# Patient Record
Sex: FEMALE | Race: BLACK OR AFRICAN AMERICAN | NOT HISPANIC OR LATINO | URBAN - METROPOLITAN AREA
[De-identification: names, ages, dates, MRNs, and addresses within clinical notes are randomized per-mention and may not be internally consistent; named-entity substitution may affect disease eponyms.]

---

## 2023-11-24 ENCOUNTER — INPATIENT (INPATIENT)
Facility: HOSPITAL | Age: 28
LOS: 1 days | Discharge: HOME | End: 2023-11-26
Attending: STUDENT IN AN ORGANIZED HEALTH CARE EDUCATION/TRAINING PROGRAM | Admitting: STUDENT IN AN ORGANIZED HEALTH CARE EDUCATION/TRAINING PROGRAM
Payer: MEDICAID

## 2023-11-24 ENCOUNTER — TRANSCRIPTION ENCOUNTER (OUTPATIENT)
Age: 28
End: 2023-11-24

## 2023-11-24 VITALS
HEART RATE: 102 BPM | DIASTOLIC BLOOD PRESSURE: 88 MMHG | TEMPERATURE: 99 F | SYSTOLIC BLOOD PRESSURE: 128 MMHG | OXYGEN SATURATION: 100 % | RESPIRATION RATE: 20 BRPM

## 2023-11-24 PROCEDURE — 99285 EMERGENCY DEPT VISIT HI MDM: CPT

## 2023-11-24 RX ORDER — IOHEXOL 300 MG/ML
30 INJECTION, SOLUTION INTRAVENOUS ONCE
Refills: 0 | Status: COMPLETED | OUTPATIENT
Start: 2023-11-24 | End: 2023-11-25

## 2023-11-24 RX ORDER — ACETAMINOPHEN 500 MG
1000 TABLET ORAL ONCE
Refills: 0 | Status: COMPLETED | OUTPATIENT
Start: 2023-11-24 | End: 2023-11-25

## 2023-11-24 RX ORDER — ONDANSETRON 8 MG/1
4 TABLET, FILM COATED ORAL ONCE
Refills: 0 | Status: COMPLETED | OUTPATIENT
Start: 2023-11-24 | End: 2023-11-25

## 2023-11-24 RX ORDER — FAMOTIDINE 10 MG/ML
20 INJECTION INTRAVENOUS ONCE
Refills: 0 | Status: COMPLETED | OUTPATIENT
Start: 2023-11-24 | End: 2023-11-24

## 2023-11-24 RX ORDER — SODIUM CHLORIDE 9 MG/ML
1000 INJECTION INTRAMUSCULAR; INTRAVENOUS; SUBCUTANEOUS ONCE
Refills: 0 | Status: COMPLETED | OUTPATIENT
Start: 2023-11-24 | End: 2023-11-24

## 2023-11-24 NOTE — ED ADULT NURSE NOTE - NSFALLUNIVINTERV_ED_ALL_ED
Bed/Stretcher in lowest position, wheels locked, appropriate side rails in place/Call bell, personal items and telephone in reach/Instruct patient to call for assistance before getting out of bed/chair/stretcher/Non-slip footwear applied when patient is off stretcher/[place] to call system/Physically safe environment - no spills, clutter or unnecessary equipment/Purposeful proactive rounding/Room/bathroom lighting operational, light cord in reach

## 2023-11-24 NOTE — ED ADULT NURSE NOTE - IS THE PATIENT ABLE TO BE SCREENED?
Quality 402: Tobacco Use And Help With Quitting Among Adolescents: Patient screened for tobacco and never smoked
Quality 110: Preventive Care And Screening: Influenza Immunization: Influenza Immunization Administered during Influenza season
Quality 111:Pneumonia Vaccination Status For Older Adults: Pneumococcal Vaccination Previously Received
Quality 130: Documentation Of Current Medications In The Medical Record: Current Medications Documented
Detail Level: Simple
Yes

## 2023-11-24 NOTE — ED ADULT NURSE NOTE - OBJECTIVE STATEMENT
Pt received c/o abdominal pain, nausea and feeling like she needs to vomit but is only able to dry heave. Pt is alert and oriented x 4, ambulatory. Pt endorses smoking marijuana everyday, denies drinking and/or other drug use. Denies pertinent past medical history. MD saunders in progress, Awaiting orders. Safety maintained.

## 2023-11-24 NOTE — ED ADULT TRIAGE NOTE - NS ED TRIAGE AVPU SCALE
Alert-The patient is alert, awake and responds to voice. The patient is oriented to time, place, and person. The triage nurse is able to obtain subjective information. no known mental health issues.

## 2023-11-24 NOTE — ED ADULT TRIAGE NOTE - CHIEF COMPLAINT QUOTE
abd pain    c/o abd pain rad to the back and chest with nausea, vomiting, diarrhea.  actively vomiting in triage.  appears uncomfortable.  past medical history- appendectomy, gastric bypass,

## 2023-11-25 ENCOUNTER — RESULT REVIEW (OUTPATIENT)
Age: 28
End: 2023-11-25

## 2023-11-25 DIAGNOSIS — R11.2 NAUSEA WITH VOMITING, UNSPECIFIED: ICD-10-CM

## 2023-11-25 DIAGNOSIS — Z98.84 BARIATRIC SURGERY STATUS: Chronic | ICD-10-CM

## 2023-11-25 DIAGNOSIS — Z90.49 ACQUIRED ABSENCE OF OTHER SPECIFIED PARTS OF DIGESTIVE TRACT: Chronic | ICD-10-CM

## 2023-11-25 LAB
ALBUMIN SERPL ELPH-MCNC: 3.9 G/DL — SIGNIFICANT CHANGE UP (ref 3.3–5)
ALBUMIN SERPL ELPH-MCNC: 3.9 G/DL — SIGNIFICANT CHANGE UP (ref 3.3–5)
ALBUMIN SERPL ELPH-MCNC: 4.1 G/DL — SIGNIFICANT CHANGE UP (ref 3.3–5)
ALBUMIN SERPL ELPH-MCNC: 4.1 G/DL — SIGNIFICANT CHANGE UP (ref 3.3–5)
ALP SERPL-CCNC: 324 U/L — HIGH (ref 40–120)
ALP SERPL-CCNC: 324 U/L — HIGH (ref 40–120)
ALP SERPL-CCNC: 340 U/L — HIGH (ref 40–120)
ALP SERPL-CCNC: 340 U/L — HIGH (ref 40–120)
ALT FLD-CCNC: 378 U/L — HIGH (ref 4–33)
ALT FLD-CCNC: 378 U/L — HIGH (ref 4–33)
ALT FLD-CCNC: 643 U/L — HIGH (ref 4–33)
ALT FLD-CCNC: 643 U/L — HIGH (ref 4–33)
ANION GAP SERPL CALC-SCNC: 11 MMOL/L — SIGNIFICANT CHANGE UP (ref 7–14)
ANION GAP SERPL CALC-SCNC: 11 MMOL/L — SIGNIFICANT CHANGE UP (ref 7–14)
APAP SERPL-MCNC: <10 UG/ML — LOW (ref 15–25)
APAP SERPL-MCNC: <10 UG/ML — LOW (ref 15–25)
APPEARANCE UR: CLEAR — SIGNIFICANT CHANGE UP
APPEARANCE UR: CLEAR — SIGNIFICANT CHANGE UP
APTT BLD: 25.4 SEC — SIGNIFICANT CHANGE UP (ref 24.5–35.6)
APTT BLD: 25.4 SEC — SIGNIFICANT CHANGE UP (ref 24.5–35.6)
AST SERPL-CCNC: 1553 U/L — HIGH (ref 4–32)
AST SERPL-CCNC: 1553 U/L — HIGH (ref 4–32)
AST SERPL-CCNC: 998 U/L — HIGH (ref 4–32)
AST SERPL-CCNC: 998 U/L — HIGH (ref 4–32)
BASE EXCESS BLDV CALC-SCNC: -4.2 MMOL/L — LOW (ref -2–3)
BASE EXCESS BLDV CALC-SCNC: -4.2 MMOL/L — LOW (ref -2–3)
BASOPHILS # BLD AUTO: 0.02 K/UL — SIGNIFICANT CHANGE UP (ref 0–0.2)
BASOPHILS # BLD AUTO: 0.02 K/UL — SIGNIFICANT CHANGE UP (ref 0–0.2)
BASOPHILS NFR BLD AUTO: 0.2 % — SIGNIFICANT CHANGE UP (ref 0–2)
BASOPHILS NFR BLD AUTO: 0.2 % — SIGNIFICANT CHANGE UP (ref 0–2)
BILIRUB DIRECT SERPL-MCNC: 0.3 MG/DL — SIGNIFICANT CHANGE UP (ref 0–0.3)
BILIRUB DIRECT SERPL-MCNC: 0.3 MG/DL — SIGNIFICANT CHANGE UP (ref 0–0.3)
BILIRUB INDIRECT FLD-MCNC: 0.3 MG/DL — SIGNIFICANT CHANGE UP (ref 0–1)
BILIRUB INDIRECT FLD-MCNC: 0.3 MG/DL — SIGNIFICANT CHANGE UP (ref 0–1)
BILIRUB SERPL-MCNC: 0.6 MG/DL — SIGNIFICANT CHANGE UP (ref 0.2–1.2)
BILIRUB SERPL-MCNC: 0.6 MG/DL — SIGNIFICANT CHANGE UP (ref 0.2–1.2)
BILIRUB SERPL-MCNC: 0.7 MG/DL — SIGNIFICANT CHANGE UP (ref 0.2–1.2)
BILIRUB SERPL-MCNC: 0.7 MG/DL — SIGNIFICANT CHANGE UP (ref 0.2–1.2)
BILIRUB UR-MCNC: NEGATIVE — SIGNIFICANT CHANGE UP
BILIRUB UR-MCNC: NEGATIVE — SIGNIFICANT CHANGE UP
BLD GP AB SCN SERPL QL: NEGATIVE — SIGNIFICANT CHANGE UP
BLD GP AB SCN SERPL QL: NEGATIVE — SIGNIFICANT CHANGE UP
BLOOD GAS VENOUS COMPREHENSIVE RESULT: SIGNIFICANT CHANGE UP
BLOOD GAS VENOUS COMPREHENSIVE RESULT: SIGNIFICANT CHANGE UP
BUN SERPL-MCNC: 9 MG/DL — SIGNIFICANT CHANGE UP (ref 7–23)
BUN SERPL-MCNC: 9 MG/DL — SIGNIFICANT CHANGE UP (ref 7–23)
CALCIUM SERPL-MCNC: 8.9 MG/DL — SIGNIFICANT CHANGE UP (ref 8.4–10.5)
CALCIUM SERPL-MCNC: 8.9 MG/DL — SIGNIFICANT CHANGE UP (ref 8.4–10.5)
CHLORIDE BLDV-SCNC: 105 MMOL/L — SIGNIFICANT CHANGE UP (ref 96–108)
CHLORIDE BLDV-SCNC: 105 MMOL/L — SIGNIFICANT CHANGE UP (ref 96–108)
CHLORIDE SERPL-SCNC: 104 MMOL/L — SIGNIFICANT CHANGE UP (ref 98–107)
CHLORIDE SERPL-SCNC: 104 MMOL/L — SIGNIFICANT CHANGE UP (ref 98–107)
CO2 BLDV-SCNC: 22.2 MMOL/L — SIGNIFICANT CHANGE UP (ref 22–26)
CO2 BLDV-SCNC: 22.2 MMOL/L — SIGNIFICANT CHANGE UP (ref 22–26)
CO2 SERPL-SCNC: 21 MMOL/L — LOW (ref 22–31)
CO2 SERPL-SCNC: 21 MMOL/L — LOW (ref 22–31)
COLOR SPEC: YELLOW — SIGNIFICANT CHANGE UP
COLOR SPEC: YELLOW — SIGNIFICANT CHANGE UP
CREAT SERPL-MCNC: 0.7 MG/DL — SIGNIFICANT CHANGE UP (ref 0.5–1.3)
CREAT SERPL-MCNC: 0.7 MG/DL — SIGNIFICANT CHANGE UP (ref 0.5–1.3)
DIFF PNL FLD: NEGATIVE — SIGNIFICANT CHANGE UP
DIFF PNL FLD: NEGATIVE — SIGNIFICANT CHANGE UP
EGFR: 122 ML/MIN/1.73M2 — SIGNIFICANT CHANGE UP
EGFR: 122 ML/MIN/1.73M2 — SIGNIFICANT CHANGE UP
EOSINOPHIL # BLD AUTO: 0.06 K/UL — SIGNIFICANT CHANGE UP (ref 0–0.5)
EOSINOPHIL # BLD AUTO: 0.06 K/UL — SIGNIFICANT CHANGE UP (ref 0–0.5)
EOSINOPHIL NFR BLD AUTO: 0.7 % — SIGNIFICANT CHANGE UP (ref 0–6)
EOSINOPHIL NFR BLD AUTO: 0.7 % — SIGNIFICANT CHANGE UP (ref 0–6)
GAS PNL BLDV: 134 MMOL/L — LOW (ref 136–145)
GAS PNL BLDV: 134 MMOL/L — LOW (ref 136–145)
GLUCOSE BLDV-MCNC: 120 MG/DL — HIGH (ref 70–99)
GLUCOSE BLDV-MCNC: 120 MG/DL — HIGH (ref 70–99)
GLUCOSE SERPL-MCNC: 126 MG/DL — HIGH (ref 70–99)
GLUCOSE SERPL-MCNC: 126 MG/DL — HIGH (ref 70–99)
GLUCOSE UR QL: NEGATIVE MG/DL — SIGNIFICANT CHANGE UP
GLUCOSE UR QL: NEGATIVE MG/DL — SIGNIFICANT CHANGE UP
HAV IGM SER-ACNC: SIGNIFICANT CHANGE UP
HAV IGM SER-ACNC: SIGNIFICANT CHANGE UP
HBV CORE IGM SER-ACNC: SIGNIFICANT CHANGE UP
HBV CORE IGM SER-ACNC: SIGNIFICANT CHANGE UP
HBV SURFACE AG SER-ACNC: SIGNIFICANT CHANGE UP
HBV SURFACE AG SER-ACNC: SIGNIFICANT CHANGE UP
HCG SERPL-ACNC: <1 MIU/ML — SIGNIFICANT CHANGE UP
HCG SERPL-ACNC: <1 MIU/ML — SIGNIFICANT CHANGE UP
HCO3 BLDV-SCNC: 21 MMOL/L — LOW (ref 22–29)
HCO3 BLDV-SCNC: 21 MMOL/L — LOW (ref 22–29)
HCT VFR BLD CALC: 35.2 % — SIGNIFICANT CHANGE UP (ref 34.5–45)
HCT VFR BLD CALC: 35.2 % — SIGNIFICANT CHANGE UP (ref 34.5–45)
HCT VFR BLDA CALC: 36 % — SIGNIFICANT CHANGE UP (ref 34.5–46.5)
HCT VFR BLDA CALC: 36 % — SIGNIFICANT CHANGE UP (ref 34.5–46.5)
HCV AB S/CO SERPL IA: 0.07 S/CO — SIGNIFICANT CHANGE UP (ref 0–0.99)
HCV AB S/CO SERPL IA: 0.07 S/CO — SIGNIFICANT CHANGE UP (ref 0–0.99)
HCV AB SERPL-IMP: SIGNIFICANT CHANGE UP
HCV AB SERPL-IMP: SIGNIFICANT CHANGE UP
HGB BLD CALC-MCNC: 11.9 G/DL — SIGNIFICANT CHANGE UP (ref 11.7–16.1)
HGB BLD CALC-MCNC: 11.9 G/DL — SIGNIFICANT CHANGE UP (ref 11.7–16.1)
HGB BLD-MCNC: 11.3 G/DL — LOW (ref 11.5–15.5)
HGB BLD-MCNC: 11.3 G/DL — LOW (ref 11.5–15.5)
IANC: 6.88 K/UL — SIGNIFICANT CHANGE UP (ref 1.8–7.4)
IANC: 6.88 K/UL — SIGNIFICANT CHANGE UP (ref 1.8–7.4)
IMM GRANULOCYTES NFR BLD AUTO: 0.2 % — SIGNIFICANT CHANGE UP (ref 0–0.9)
IMM GRANULOCYTES NFR BLD AUTO: 0.2 % — SIGNIFICANT CHANGE UP (ref 0–0.9)
INR BLD: 1.09 RATIO — SIGNIFICANT CHANGE UP (ref 0.85–1.18)
INR BLD: 1.09 RATIO — SIGNIFICANT CHANGE UP (ref 0.85–1.18)
KETONES UR-MCNC: NEGATIVE MG/DL — SIGNIFICANT CHANGE UP
KETONES UR-MCNC: NEGATIVE MG/DL — SIGNIFICANT CHANGE UP
LACTATE BLDV-MCNC: 1.4 MMOL/L — SIGNIFICANT CHANGE UP (ref 0.5–2)
LACTATE BLDV-MCNC: 1.4 MMOL/L — SIGNIFICANT CHANGE UP (ref 0.5–2)
LEUKOCYTE ESTERASE UR-ACNC: NEGATIVE — SIGNIFICANT CHANGE UP
LEUKOCYTE ESTERASE UR-ACNC: NEGATIVE — SIGNIFICANT CHANGE UP
LIDOCAIN IGE QN: 23 U/L — SIGNIFICANT CHANGE UP (ref 7–60)
LIDOCAIN IGE QN: 23 U/L — SIGNIFICANT CHANGE UP (ref 7–60)
LYMPHOCYTES # BLD AUTO: 0.58 K/UL — LOW (ref 1–3.3)
LYMPHOCYTES # BLD AUTO: 0.58 K/UL — LOW (ref 1–3.3)
LYMPHOCYTES # BLD AUTO: 7.2 % — LOW (ref 13–44)
LYMPHOCYTES # BLD AUTO: 7.2 % — LOW (ref 13–44)
MCHC RBC-ENTMCNC: 29.6 PG — SIGNIFICANT CHANGE UP (ref 27–34)
MCHC RBC-ENTMCNC: 29.6 PG — SIGNIFICANT CHANGE UP (ref 27–34)
MCHC RBC-ENTMCNC: 32.1 GM/DL — SIGNIFICANT CHANGE UP (ref 32–36)
MCHC RBC-ENTMCNC: 32.1 GM/DL — SIGNIFICANT CHANGE UP (ref 32–36)
MCV RBC AUTO: 92.1 FL — SIGNIFICANT CHANGE UP (ref 80–100)
MCV RBC AUTO: 92.1 FL — SIGNIFICANT CHANGE UP (ref 80–100)
MONOCYTES # BLD AUTO: 0.54 K/UL — SIGNIFICANT CHANGE UP (ref 0–0.9)
MONOCYTES # BLD AUTO: 0.54 K/UL — SIGNIFICANT CHANGE UP (ref 0–0.9)
MONOCYTES NFR BLD AUTO: 6.7 % — SIGNIFICANT CHANGE UP (ref 2–14)
MONOCYTES NFR BLD AUTO: 6.7 % — SIGNIFICANT CHANGE UP (ref 2–14)
NEUTROPHILS # BLD AUTO: 6.88 K/UL — SIGNIFICANT CHANGE UP (ref 1.8–7.4)
NEUTROPHILS # BLD AUTO: 6.88 K/UL — SIGNIFICANT CHANGE UP (ref 1.8–7.4)
NEUTROPHILS NFR BLD AUTO: 85 % — HIGH (ref 43–77)
NEUTROPHILS NFR BLD AUTO: 85 % — HIGH (ref 43–77)
NITRITE UR-MCNC: NEGATIVE — SIGNIFICANT CHANGE UP
NITRITE UR-MCNC: NEGATIVE — SIGNIFICANT CHANGE UP
NRBC # BLD: 0 /100 WBCS — SIGNIFICANT CHANGE UP (ref 0–0)
NRBC # BLD: 0 /100 WBCS — SIGNIFICANT CHANGE UP (ref 0–0)
NRBC # FLD: 0 K/UL — SIGNIFICANT CHANGE UP (ref 0–0)
NRBC # FLD: 0 K/UL — SIGNIFICANT CHANGE UP (ref 0–0)
PCO2 BLDV: 38 MMHG — LOW (ref 39–52)
PCO2 BLDV: 38 MMHG — LOW (ref 39–52)
PH BLDV: 7.35 — SIGNIFICANT CHANGE UP (ref 7.32–7.43)
PH BLDV: 7.35 — SIGNIFICANT CHANGE UP (ref 7.32–7.43)
PH UR: 5.5 — SIGNIFICANT CHANGE UP (ref 5–8)
PH UR: 5.5 — SIGNIFICANT CHANGE UP (ref 5–8)
PLATELET # BLD AUTO: 305 K/UL — SIGNIFICANT CHANGE UP (ref 150–400)
PLATELET # BLD AUTO: 305 K/UL — SIGNIFICANT CHANGE UP (ref 150–400)
PO2 BLDV: 48 MMHG — HIGH (ref 25–45)
PO2 BLDV: 48 MMHG — HIGH (ref 25–45)
POTASSIUM BLDV-SCNC: 4.1 MMOL/L — SIGNIFICANT CHANGE UP (ref 3.5–5.1)
POTASSIUM BLDV-SCNC: 4.1 MMOL/L — SIGNIFICANT CHANGE UP (ref 3.5–5.1)
POTASSIUM SERPL-MCNC: 3.8 MMOL/L — SIGNIFICANT CHANGE UP (ref 3.5–5.3)
POTASSIUM SERPL-MCNC: 3.8 MMOL/L — SIGNIFICANT CHANGE UP (ref 3.5–5.3)
POTASSIUM SERPL-SCNC: 3.8 MMOL/L — SIGNIFICANT CHANGE UP (ref 3.5–5.3)
POTASSIUM SERPL-SCNC: 3.8 MMOL/L — SIGNIFICANT CHANGE UP (ref 3.5–5.3)
PROT SERPL-MCNC: 6.9 G/DL — SIGNIFICANT CHANGE UP (ref 6–8.3)
PROT SERPL-MCNC: 6.9 G/DL — SIGNIFICANT CHANGE UP (ref 6–8.3)
PROT SERPL-MCNC: 7.3 G/DL — SIGNIFICANT CHANGE UP (ref 6–8.3)
PROT SERPL-MCNC: 7.3 G/DL — SIGNIFICANT CHANGE UP (ref 6–8.3)
PROT UR-MCNC: NEGATIVE MG/DL — SIGNIFICANT CHANGE UP
PROT UR-MCNC: NEGATIVE MG/DL — SIGNIFICANT CHANGE UP
PROTHROM AB SERPL-ACNC: 12.3 SEC — SIGNIFICANT CHANGE UP (ref 9.5–13)
PROTHROM AB SERPL-ACNC: 12.3 SEC — SIGNIFICANT CHANGE UP (ref 9.5–13)
RBC # BLD: 3.82 M/UL — SIGNIFICANT CHANGE UP (ref 3.8–5.2)
RBC # BLD: 3.82 M/UL — SIGNIFICANT CHANGE UP (ref 3.8–5.2)
RBC # FLD: 13.5 % — SIGNIFICANT CHANGE UP (ref 10.3–14.5)
RBC # FLD: 13.5 % — SIGNIFICANT CHANGE UP (ref 10.3–14.5)
RH IG SCN BLD-IMP: POSITIVE — SIGNIFICANT CHANGE UP
RH IG SCN BLD-IMP: POSITIVE — SIGNIFICANT CHANGE UP
SAO2 % BLDV: 76.7 % — SIGNIFICANT CHANGE UP (ref 67–88)
SAO2 % BLDV: 76.7 % — SIGNIFICANT CHANGE UP (ref 67–88)
SODIUM SERPL-SCNC: 136 MMOL/L — SIGNIFICANT CHANGE UP (ref 135–145)
SODIUM SERPL-SCNC: 136 MMOL/L — SIGNIFICANT CHANGE UP (ref 135–145)
SP GR SPEC: 1.02 — SIGNIFICANT CHANGE UP (ref 1–1.03)
SP GR SPEC: 1.02 — SIGNIFICANT CHANGE UP (ref 1–1.03)
TROPONIN T, HIGH SENSITIVITY RESULT: <6 NG/L — SIGNIFICANT CHANGE UP
TROPONIN T, HIGH SENSITIVITY RESULT: <6 NG/L — SIGNIFICANT CHANGE UP
UROBILINOGEN FLD QL: 0.2 MG/DL — SIGNIFICANT CHANGE UP (ref 0.2–1)
UROBILINOGEN FLD QL: 0.2 MG/DL — SIGNIFICANT CHANGE UP (ref 0.2–1)
WBC # BLD: 8.1 K/UL — SIGNIFICANT CHANGE UP (ref 3.8–10.5)
WBC # BLD: 8.1 K/UL — SIGNIFICANT CHANGE UP (ref 3.8–10.5)
WBC # FLD AUTO: 8.1 K/UL — SIGNIFICANT CHANGE UP (ref 3.8–10.5)
WBC # FLD AUTO: 8.1 K/UL — SIGNIFICANT CHANGE UP (ref 3.8–10.5)

## 2023-11-25 PROCEDURE — 76705 ECHO EXAM OF ABDOMEN: CPT | Mod: 26

## 2023-11-25 PROCEDURE — 74018 RADEX ABDOMEN 1 VIEW: CPT | Mod: 26

## 2023-11-25 PROCEDURE — 99222 1ST HOSP IP/OBS MODERATE 55: CPT | Mod: 57,GC

## 2023-11-25 PROCEDURE — 71046 X-RAY EXAM CHEST 2 VIEWS: CPT | Mod: 26

## 2023-11-25 PROCEDURE — 47563 LAPARO CHOLECYSTECTOMY/GRAPH: CPT | Mod: GC

## 2023-11-25 PROCEDURE — 88304 TISSUE EXAM BY PATHOLOGIST: CPT | Mod: 26

## 2023-11-25 PROCEDURE — 74177 CT ABD & PELVIS W/CONTRAST: CPT | Mod: 26,MA

## 2023-11-25 DEVICE — LIGATING CLIPS WECK HEMOLOK POLYMER MEDIUM-LARGE (GREEN) 6: Type: IMPLANTABLE DEVICE | Status: FUNCTIONAL

## 2023-11-25 DEVICE — CATH REDDICK W/TROCAR 4FRX50CM: Type: IMPLANTABLE DEVICE | Status: FUNCTIONAL

## 2023-11-25 RX ORDER — HYDROMORPHONE HYDROCHLORIDE 2 MG/ML
0.2 INJECTION INTRAMUSCULAR; INTRAVENOUS; SUBCUTANEOUS EVERY 4 HOURS
Refills: 0 | Status: DISCONTINUED | OUTPATIENT
Start: 2023-11-25 | End: 2023-11-25

## 2023-11-25 RX ORDER — FENTANYL CITRATE 50 UG/ML
25 INJECTION INTRAVENOUS
Refills: 0 | Status: DISCONTINUED | OUTPATIENT
Start: 2023-11-25 | End: 2023-11-25

## 2023-11-25 RX ORDER — SODIUM CHLORIDE 9 MG/ML
1000 INJECTION, SOLUTION INTRAVENOUS
Refills: 0 | Status: DISCONTINUED | OUTPATIENT
Start: 2023-11-25 | End: 2023-11-26

## 2023-11-25 RX ORDER — HYDROMORPHONE HYDROCHLORIDE 2 MG/ML
0.5 INJECTION INTRAMUSCULAR; INTRAVENOUS; SUBCUTANEOUS EVERY 4 HOURS
Refills: 0 | Status: DISCONTINUED | OUTPATIENT
Start: 2023-11-25 | End: 2023-11-25

## 2023-11-25 RX ORDER — OXYCODONE HYDROCHLORIDE 5 MG/1
5 TABLET ORAL ONCE
Refills: 0 | Status: DISCONTINUED | OUTPATIENT
Start: 2023-11-25 | End: 2023-11-25

## 2023-11-25 RX ORDER — OXYCODONE HYDROCHLORIDE 5 MG/1
5 TABLET ORAL EVERY 4 HOURS
Refills: 0 | Status: DISCONTINUED | OUTPATIENT
Start: 2023-11-25 | End: 2023-11-26

## 2023-11-25 RX ORDER — ACETAMINOPHEN 500 MG
975 TABLET ORAL EVERY 6 HOURS
Refills: 0 | Status: DISCONTINUED | OUTPATIENT
Start: 2023-11-25 | End: 2023-11-26

## 2023-11-25 RX ORDER — OXYCODONE HYDROCHLORIDE 5 MG/1
2.5 TABLET ORAL EVERY 4 HOURS
Refills: 0 | Status: DISCONTINUED | OUTPATIENT
Start: 2023-11-25 | End: 2023-11-26

## 2023-11-25 RX ORDER — ONDANSETRON 8 MG/1
4 TABLET, FILM COATED ORAL ONCE
Refills: 0 | Status: DISCONTINUED | OUTPATIENT
Start: 2023-11-25 | End: 2023-11-25

## 2023-11-25 RX ORDER — HYDROMORPHONE HYDROCHLORIDE 2 MG/ML
0.5 INJECTION INTRAMUSCULAR; INTRAVENOUS; SUBCUTANEOUS
Refills: 0 | Status: DISCONTINUED | OUTPATIENT
Start: 2023-11-25 | End: 2023-11-25

## 2023-11-25 RX ORDER — INFLUENZA VIRUS VACCINE 15; 15; 15; 15 UG/.5ML; UG/.5ML; UG/.5ML; UG/.5ML
0.5 SUSPENSION INTRAMUSCULAR ONCE
Refills: 0 | Status: DISCONTINUED | OUTPATIENT
Start: 2023-11-25 | End: 2023-11-26

## 2023-11-25 RX ORDER — ENOXAPARIN SODIUM 100 MG/ML
40 INJECTION SUBCUTANEOUS EVERY 24 HOURS
Refills: 0 | Status: DISCONTINUED | OUTPATIENT
Start: 2023-11-25 | End: 2023-11-26

## 2023-11-25 RX ORDER — ACETAMINOPHEN 500 MG
1000 TABLET ORAL EVERY 6 HOURS
Refills: 0 | Status: DISCONTINUED | OUTPATIENT
Start: 2023-11-25 | End: 2023-11-25

## 2023-11-25 RX ORDER — ACETAMINOPHEN 500 MG
500 TABLET ORAL EVERY 6 HOURS
Refills: 0 | Status: DISCONTINUED | OUTPATIENT
Start: 2023-11-25 | End: 2023-11-25

## 2023-11-25 RX ORDER — FENTANYL CITRATE 50 UG/ML
50 INJECTION INTRAVENOUS
Refills: 0 | Status: DISCONTINUED | OUTPATIENT
Start: 2023-11-25 | End: 2023-11-25

## 2023-11-25 RX ADMIN — OXYCODONE HYDROCHLORIDE 5 MILLIGRAM(S): 5 TABLET ORAL at 17:30

## 2023-11-25 RX ADMIN — ENOXAPARIN SODIUM 40 MILLIGRAM(S): 100 INJECTION SUBCUTANEOUS at 12:19

## 2023-11-25 RX ADMIN — OXYCODONE HYDROCHLORIDE 5 MILLIGRAM(S): 5 TABLET ORAL at 17:00

## 2023-11-25 RX ADMIN — HYDROMORPHONE HYDROCHLORIDE 0.5 MILLIGRAM(S): 2 INJECTION INTRAMUSCULAR; INTRAVENOUS; SUBCUTANEOUS at 17:00

## 2023-11-25 RX ADMIN — Medication 200 MILLIGRAM(S): at 12:19

## 2023-11-25 RX ADMIN — HYDROMORPHONE HYDROCHLORIDE 0.5 MILLIGRAM(S): 2 INJECTION INTRAMUSCULAR; INTRAVENOUS; SUBCUTANEOUS at 16:30

## 2023-11-25 RX ADMIN — Medication 975 MILLIGRAM(S): at 23:11

## 2023-11-25 RX ADMIN — Medication 1000 MILLIGRAM(S): at 01:37

## 2023-11-25 RX ADMIN — OXYCODONE HYDROCHLORIDE 5 MILLIGRAM(S): 5 TABLET ORAL at 22:10

## 2023-11-25 RX ADMIN — Medication 30 MILLILITER(S): at 00:37

## 2023-11-25 RX ADMIN — Medication 400 MILLIGRAM(S): at 00:37

## 2023-11-25 RX ADMIN — SODIUM CHLORIDE 50 MILLILITER(S): 9 INJECTION, SOLUTION INTRAVENOUS at 19:54

## 2023-11-25 RX ADMIN — SODIUM CHLORIDE 1000 MILLILITER(S): 9 INJECTION INTRAMUSCULAR; INTRAVENOUS; SUBCUTANEOUS at 00:35

## 2023-11-25 RX ADMIN — FAMOTIDINE 20 MILLIGRAM(S): 10 INJECTION INTRAVENOUS at 00:34

## 2023-11-25 RX ADMIN — OXYCODONE HYDROCHLORIDE 5 MILLIGRAM(S): 5 TABLET ORAL at 22:40

## 2023-11-25 RX ADMIN — IOHEXOL 30 MILLILITER(S): 300 INJECTION, SOLUTION INTRAVENOUS at 00:38

## 2023-11-25 RX ADMIN — Medication 975 MILLIGRAM(S): at 17:39

## 2023-11-25 RX ADMIN — ONDANSETRON 4 MILLIGRAM(S): 8 TABLET, FILM COATED ORAL at 00:37

## 2023-11-25 RX ADMIN — Medication 975 MILLIGRAM(S): at 17:40

## 2023-11-25 NOTE — CHART NOTE - NSCHARTNOTEFT_GEN_A_CORE
General Surgery Post op Check    Pt seen and examined without complaints. Pain is controlled. Denies SOB/CP/N/V.     Vital Signs Last 24 Hrs  T(C): 36.4 (25 Nov 2023 17:55), Max: 37.1 (25 Nov 2023 16:15)  T(F): 97.6 (25 Nov 2023 17:55), Max: 98.8 (25 Nov 2023 16:15)  HR: 52 (25 Nov 2023 17:55) (52 - 95)  BP: 115/68 (25 Nov 2023 17:55) (98/61 - 127/81)  BP(mean): 82 (25 Nov 2023 17:45) (77 - 97)  RR: 18 (25 Nov 2023 17:55) (15 - 20)  SpO2: 100% (25 Nov 2023 17:55) (96% - 100%)    Parameters below as of 25 Nov 2023 17:55  Patient On (Oxygen Delivery Method): room air        I&O's Summary    25 Nov 2023 07:01  -  25 Nov 2023 21:20  --------------------------------------------------------  IN: 465 mL / OUT: 0 mL / NET: 465 mL        Physical Exam  Gen: NAD, A&Ox3  Pulm: No respiratory distress, no subcostal retractions  CV: RRR, no JVD  Abd: Soft, Non-tender, non-distended, incisions c/d/i  UOP: 0cc      A/P: 28y F s/p lap cholecystectomy. Patient endorses appropriate pain control and denies nausea, vomits, fever, chills, or SOB. Abdominal exam with appropriate incisional tenderness without significant distention, or bleeding/oozing from incision sites. Patient hemodynamically stable recovering well post-procedure.     -Encouraged OOB  -Diet: Regular   -Strict I&O's  -Analgesia and antiemetics as needed  -Dispo:  Floor

## 2023-11-25 NOTE — H&P ADULT - ASSESSMENT
28F hx robotic RYGB (February 2022 [facility]), laparoscopic appendectomy (August 2023, [facility]) presenting with midepigastric and RUQ abdominal pain, cholelithiasis, transaminitis concerning for choledocholithiasis vs passed stone. CT and US showing cholelithiasis and periportal/periductal edema. No signs of cholecystitis, no leukocytosis or fevers to suggest cholangitis.    Admit to Dr. Hazel Oneal  NPO  IVF  OR for lap zayra, IOC, poss CBDE  Pain control PRN  SCDs, LVX    Kaitlin Salazar, PGY3  B Team Surgery [id number]

## 2023-11-25 NOTE — ED PROVIDER NOTE - PROGRESS NOTE DETAILS
Isabelle Torres MD, PGY2:  Patient with elevated LFTs concerning for biliary disease versus hepatic disease, pending CT read at this time, will consider obtaining right upper quadrant ultrasound pending CT findings, will get hepatitis panel and acetaminophen level. Isabelle Torres MD, PGY2: CT findings shows concern for bilary duct dilation, will obtain RUQ US now Isabelle Torres MD, PGY2: pending USr, surg consulted, will come see pt Isabelle Torres MD, PGY2: surg accepted pt for admission

## 2023-11-25 NOTE — H&P ADULT - HISTORY OF PRESENT ILLNESS
28F hx robotic RYGB (February 2022 [facility]), laparoscopic appendectomy (August 2023, [facility]) presenting with one day midepigastric abdominal pain radiating to RUQ and back associated with nausea and one episode dry heaving. Denies fevers, chills, emesis. Is currently passing flatus, last BM last night which was normal per patient. She has lost 138 lbs since her bariatric surgery.

## 2023-11-25 NOTE — H&P ADULT - NSHPPHYSICALEXAM_GEN_ALL_CORE
Vital Signs Last 24 Hrs  T(C): 36.4 (25 Nov 2023 05:55), Max: 37 (24 Nov 2023 21:15)  T(F): 97.6 (25 Nov 2023 05:55), Max: 98.6 (24 Nov 2023 21:15)  HR: 62 (25 Nov 2023 05:55) (62 - 102)  BP: 108/64 (25 Nov 2023 05:55) (108/64 - 128/88)  BP(mean): --  RR: 18 (25 Nov 2023 05:55) (18 - 20)  SpO2: 100% (25 Nov 2023 05:55) (100% - 100%)    Parameters below as of 25 Nov 2023 05:55  Patient On (Oxygen Delivery Method): room air    General: well developed, well nourished, NAD  Neuro: alert and oriented, no focal deficits, moves all extremities spontaneously  HEENT: NCAT, EOMI, anicteric, mucosa moist  Respiratory: airway patent, respirations unlabored  CVS: regular rate and rhythm  Abdomen: soft, nontender, nondistended, incisions well healed  Extremities: no edema, sensation and movement grossly intact  Skin: warm, dry, appropriate color

## 2023-11-25 NOTE — PATIENT PROFILE ADULT - FALL HARM RISK - HARM RISK INTERVENTIONS

## 2023-11-25 NOTE — ED PROVIDER NOTE - ATTENDING CONTRIBUTION TO CARE
28-year-old female, no past medical history, past surgical history of appendectomy and gastric bypass (2022, [place]), presents to ED complaining of sudden onset diffuse abdominal pain associated with nausea/vomiting/diarrhea x few hours.  Patient states vomit is nonbilious/nonbloody.  Denies any blood in stool.  Patient states no other family members with similar symptoms.  Patient denies fever or recent URI symptoms.  Denies urinary symptoms, weakness/numbness, lightheadedness/dizziness or any other symptoms at this time.  Denies recent travel/surgeries.    Vital signs stable.  Physical exam significant for uncomfortable appearing patient in no acute distress.  Head is normocephalic/atraumatic.  Extraocular movements intact.  Pupils equal round reactive to light.  No scleral icterus.  Neck is supple.  Heart is regular rate and rhythm without murmur.  Lungs clear to auscultation bilaterally.  Abdomen is soft, however with diffuse tenderness to palpation (worse in the epigastrium).  No rebound/guarding.  Patient with right CVA tenderness to palpation.  No lower extremity edema.  Pulses are 2+ throughout.  Skin is warm and well-perfused without rashes.  Neuroexam is nonfocal.  Otherwise unremarkable exam.    Differential diagnosis includes but not limited to viral gastroenteritis versus atypical ACS versus pancreatitis versus kidney stone versus bariatric surgery complication.  Plan to check basic labs, lipase, UA, troponin, chest x-ray, CT abdomen pelvis.  Will provide IV fluid/Tylenol/Pepcid/Zofran for relief.  Dispo pending results and reassessment.

## 2023-11-25 NOTE — H&P ADULT - NSHPLABSRESULTS_GEN_ALL_CORE
11.3   8.10  )-----------( 305      ( 2023 00:30 )             35.2           136  |  104  |  9   ----------------------------<  126<H>  3.8   |  21<L>  |  0.70    Ca    8.9      2023 00:30    TPro  6.9  /  Alb  3.9  /  TBili  0.6  /  DBili  0.3  /  AST  1553<H>  /  ALT  643<H>  /  AlkPhos  340<H>                Urinalysis Basic - ( 2023 01:19 )    Color: Yellow / Appearance: Clear / S.022 / pH: x  Gluc: x / Ketone: Negative mg/dL  / Bili: Negative / Urobili: 0.2 mg/dL   Blood: x / Protein: Negative mg/dL / Nitrite: Negative   Leuk Esterase: Negative / RBC: x / WBC x   Sq Epi: x / Non Sq Epi: x / Bacteria: x        PT/INR - ( 2023 00:30 )   PT: 12.3 sec;   INR: 1.09 ratio         PTT - ( 2023 00:30 )  PTT:25.4 sec    < from: US Abdomen Limited (23 @ 04:15) >      Cholelithiasis without evidence of cholecystitis.    The cystic duct is obscured. Mild pericholecystic edema. Given the CT   appearance of the cystic duct in conjunction with the patient's clinical   symptoms, an MRI/MRCP is advised.    < end of copied text >    < from: CT Abdomen and Pelvis w/ Oral Cont and w/ IV Cont (23 @ 01:51) >    IMPRESSION:    1. Appearance of the gallbladder and cystic duct could be related to   cholangitis, or recent passage of stone. Right upper quadrant ultrasound   with attention on the extrahepatic biliary tree and gallbladder is   advised.    < end of copied text >

## 2023-11-25 NOTE — H&P ADULT - ATTENDING COMMENTS
I have reviewed the history, pertinent labs and imaging, and discussed the care with the consult resident.  More than 50% of this 55 minute encounter including face to face with the patient was spent counseling and/or coordination of care on symptomatic cholelithiasis.  Time included review of vitals, labs, imaging, discussion with consultants and coordination with the operating room/emergency department.    29yo F s/p lap bypass and lap appy presenting with RUQ pain and nausea. Found to have elevated transaminases, cholelithiasis and edema around the cystic and common duct, suspicious for passed gallstone. Recommended cholecystectomy with IOC and possible cbd exploration. Pt agreeable to proceed.     - NPO, IVF   - add on for lap zayra pending OR availability   - repeat LFTs daily      The active issues are:  1. symptomatic cholelithiasis     The Acute Care Surgery (B Team) Attending Group Practice:  Dr. Hazel Oneal    urgent issues - spectra [phone]  nonurgent issues - [phone]  patient appointments or afterhours - [phone]

## 2023-11-25 NOTE — ED ADULT NURSE REASSESSMENT NOTE - NS ED NURSE REASSESS COMMENT FT1
Report given by Carlee HE. IV inserted to Left AC 20g, labs collected and meds given as MD ordered. CT called around 12:45am to inform when oral contrast was given. Pt reports pain is better after pain medications. Pt safety maintained.

## 2023-11-26 ENCOUNTER — TRANSCRIPTION ENCOUNTER (OUTPATIENT)
Age: 28
End: 2023-11-26

## 2023-11-26 VITALS
TEMPERATURE: 98 F | RESPIRATION RATE: 18 BRPM | DIASTOLIC BLOOD PRESSURE: 56 MMHG | OXYGEN SATURATION: 100 % | HEART RATE: 63 BPM | SYSTOLIC BLOOD PRESSURE: 103 MMHG

## 2023-11-26 LAB
ALBUMIN SERPL ELPH-MCNC: 3.5 G/DL — SIGNIFICANT CHANGE UP (ref 3.3–5)
ALBUMIN SERPL ELPH-MCNC: 3.5 G/DL — SIGNIFICANT CHANGE UP (ref 3.3–5)
ALP SERPL-CCNC: 275 U/L — HIGH (ref 40–120)
ALP SERPL-CCNC: 275 U/L — HIGH (ref 40–120)
ALT FLD-CCNC: 405 U/L — HIGH (ref 4–33)
ALT FLD-CCNC: 405 U/L — HIGH (ref 4–33)
ANION GAP SERPL CALC-SCNC: 9 MMOL/L — SIGNIFICANT CHANGE UP (ref 7–14)
ANION GAP SERPL CALC-SCNC: 9 MMOL/L — SIGNIFICANT CHANGE UP (ref 7–14)
AST SERPL-CCNC: 250 U/L — HIGH (ref 4–32)
AST SERPL-CCNC: 250 U/L — HIGH (ref 4–32)
BILIRUB SERPL-MCNC: 0.3 MG/DL — SIGNIFICANT CHANGE UP (ref 0.2–1.2)
BILIRUB SERPL-MCNC: 0.3 MG/DL — SIGNIFICANT CHANGE UP (ref 0.2–1.2)
BUN SERPL-MCNC: 8 MG/DL — SIGNIFICANT CHANGE UP (ref 7–23)
BUN SERPL-MCNC: 8 MG/DL — SIGNIFICANT CHANGE UP (ref 7–23)
CALCIUM SERPL-MCNC: 9 MG/DL — SIGNIFICANT CHANGE UP (ref 8.4–10.5)
CALCIUM SERPL-MCNC: 9 MG/DL — SIGNIFICANT CHANGE UP (ref 8.4–10.5)
CHLORIDE SERPL-SCNC: 100 MMOL/L — SIGNIFICANT CHANGE UP (ref 98–107)
CHLORIDE SERPL-SCNC: 100 MMOL/L — SIGNIFICANT CHANGE UP (ref 98–107)
CO2 SERPL-SCNC: 24 MMOL/L — SIGNIFICANT CHANGE UP (ref 22–31)
CO2 SERPL-SCNC: 24 MMOL/L — SIGNIFICANT CHANGE UP (ref 22–31)
CREAT SERPL-MCNC: 0.71 MG/DL — SIGNIFICANT CHANGE UP (ref 0.5–1.3)
CREAT SERPL-MCNC: 0.71 MG/DL — SIGNIFICANT CHANGE UP (ref 0.5–1.3)
CULTURE RESULTS: SIGNIFICANT CHANGE UP
CULTURE RESULTS: SIGNIFICANT CHANGE UP
EGFR: 120 ML/MIN/1.73M2 — SIGNIFICANT CHANGE UP
EGFR: 120 ML/MIN/1.73M2 — SIGNIFICANT CHANGE UP
GLUCOSE SERPL-MCNC: 122 MG/DL — HIGH (ref 70–99)
GLUCOSE SERPL-MCNC: 122 MG/DL — HIGH (ref 70–99)
HCT VFR BLD CALC: 32.6 % — LOW (ref 34.5–45)
HCT VFR BLD CALC: 32.6 % — LOW (ref 34.5–45)
HGB BLD-MCNC: 10.6 G/DL — LOW (ref 11.5–15.5)
HGB BLD-MCNC: 10.6 G/DL — LOW (ref 11.5–15.5)
MAGNESIUM SERPL-MCNC: 1.9 MG/DL — SIGNIFICANT CHANGE UP (ref 1.6–2.6)
MAGNESIUM SERPL-MCNC: 1.9 MG/DL — SIGNIFICANT CHANGE UP (ref 1.6–2.6)
MCHC RBC-ENTMCNC: 29.7 PG — SIGNIFICANT CHANGE UP (ref 27–34)
MCHC RBC-ENTMCNC: 29.7 PG — SIGNIFICANT CHANGE UP (ref 27–34)
MCHC RBC-ENTMCNC: 32.5 GM/DL — SIGNIFICANT CHANGE UP (ref 32–36)
MCHC RBC-ENTMCNC: 32.5 GM/DL — SIGNIFICANT CHANGE UP (ref 32–36)
MCV RBC AUTO: 91.3 FL — SIGNIFICANT CHANGE UP (ref 80–100)
MCV RBC AUTO: 91.3 FL — SIGNIFICANT CHANGE UP (ref 80–100)
NRBC # BLD: 0 /100 WBCS — SIGNIFICANT CHANGE UP (ref 0–0)
NRBC # BLD: 0 /100 WBCS — SIGNIFICANT CHANGE UP (ref 0–0)
NRBC # FLD: 0 K/UL — SIGNIFICANT CHANGE UP (ref 0–0)
NRBC # FLD: 0 K/UL — SIGNIFICANT CHANGE UP (ref 0–0)
PHOSPHATE SERPL-MCNC: 3.2 MG/DL — SIGNIFICANT CHANGE UP (ref 2.5–4.5)
PHOSPHATE SERPL-MCNC: 3.2 MG/DL — SIGNIFICANT CHANGE UP (ref 2.5–4.5)
PLATELET # BLD AUTO: 316 K/UL — SIGNIFICANT CHANGE UP (ref 150–400)
PLATELET # BLD AUTO: 316 K/UL — SIGNIFICANT CHANGE UP (ref 150–400)
POTASSIUM SERPL-MCNC: 4 MMOL/L — SIGNIFICANT CHANGE UP (ref 3.5–5.3)
POTASSIUM SERPL-MCNC: 4 MMOL/L — SIGNIFICANT CHANGE UP (ref 3.5–5.3)
POTASSIUM SERPL-SCNC: 4 MMOL/L — SIGNIFICANT CHANGE UP (ref 3.5–5.3)
POTASSIUM SERPL-SCNC: 4 MMOL/L — SIGNIFICANT CHANGE UP (ref 3.5–5.3)
PROT SERPL-MCNC: 7 G/DL — SIGNIFICANT CHANGE UP (ref 6–8.3)
PROT SERPL-MCNC: 7 G/DL — SIGNIFICANT CHANGE UP (ref 6–8.3)
RBC # BLD: 3.57 M/UL — LOW (ref 3.8–5.2)
RBC # BLD: 3.57 M/UL — LOW (ref 3.8–5.2)
RBC # FLD: 13.6 % — SIGNIFICANT CHANGE UP (ref 10.3–14.5)
RBC # FLD: 13.6 % — SIGNIFICANT CHANGE UP (ref 10.3–14.5)
SODIUM SERPL-SCNC: 133 MMOL/L — LOW (ref 135–145)
SODIUM SERPL-SCNC: 133 MMOL/L — LOW (ref 135–145)
SPECIMEN SOURCE: SIGNIFICANT CHANGE UP
SPECIMEN SOURCE: SIGNIFICANT CHANGE UP
WBC # BLD: 6.19 K/UL — SIGNIFICANT CHANGE UP (ref 3.8–10.5)
WBC # BLD: 6.19 K/UL — SIGNIFICANT CHANGE UP (ref 3.8–10.5)
WBC # FLD AUTO: 6.19 K/UL — SIGNIFICANT CHANGE UP (ref 3.8–10.5)
WBC # FLD AUTO: 6.19 K/UL — SIGNIFICANT CHANGE UP (ref 3.8–10.5)

## 2023-11-26 RX ORDER — OXYCODONE HYDROCHLORIDE 5 MG/1
1 TABLET ORAL
Qty: 10 | Refills: 0
Start: 2023-11-26 | End: 2023-11-27

## 2023-11-26 RX ORDER — IBUPROFEN 200 MG
1 TABLET ORAL
Qty: 15 | Refills: 0
Start: 2023-11-26 | End: 2023-11-30

## 2023-11-26 RX ORDER — ACETAMINOPHEN 500 MG
1 TABLET ORAL
Qty: 20 | Refills: 0
Start: 2023-11-26 | End: 2023-11-30

## 2023-11-26 RX ADMIN — OXYCODONE HYDROCHLORIDE 5 MILLIGRAM(S): 5 TABLET ORAL at 10:20

## 2023-11-26 RX ADMIN — OXYCODONE HYDROCHLORIDE 5 MILLIGRAM(S): 5 TABLET ORAL at 04:00

## 2023-11-26 RX ADMIN — Medication 975 MILLIGRAM(S): at 05:29

## 2023-11-26 RX ADMIN — Medication 975 MILLIGRAM(S): at 17:53

## 2023-11-26 RX ADMIN — ENOXAPARIN SODIUM 40 MILLIGRAM(S): 100 INJECTION SUBCUTANEOUS at 12:11

## 2023-11-26 RX ADMIN — OXYCODONE HYDROCHLORIDE 5 MILLIGRAM(S): 5 TABLET ORAL at 03:30

## 2023-11-26 RX ADMIN — OXYCODONE HYDROCHLORIDE 5 MILLIGRAM(S): 5 TABLET ORAL at 09:46

## 2023-11-26 RX ADMIN — Medication 975 MILLIGRAM(S): at 12:11

## 2023-11-26 RX ADMIN — Medication 975 MILLIGRAM(S): at 12:41

## 2023-11-26 NOTE — DISCHARGE NOTE PROVIDER - NSDCACTIVITY_GEN_ALL_CORE
"  Subjective:      DATE OF VISIT: 9/4/20     ?  Patient ID:?Jaswant Yang is a 45 y.o. female.?? MR#: [id number]   ?   PRIMARY ONCOLOGIST: Dr. Hernandez    ? Primary Care Providers:  Primary Doctor No (General)     CHIEF COMPLAINT: ?  Follow-up?    ?   ONCOLOGIC DIAGNOSIS:  Hairy cell leukemia  ?   CURRENT TREATMENT: post-treatment follow-up, surveillance    PAST TREATMENT:  6/22/20 cladribine; 7/6/20 initiated rituximab, not completed due to transfusion reaction  05/11/2020 splenectomy  ?   ONCOLOGIC HISTORY:   ?   Ms Yang is a 45 year old woman with obesity and longstanding history of anemia, without treatment or further evaluation.  She does not follow with primary care and no other known diagnoses.  She presented to emergency room with left-sided abdominal pain for 2 days which she thought initially due to gas/indigestion.  She denies any associated symptoms.  CT scan revealed splenomegaly to 17 cm and concern for splenic laceration.  She was taken to the operating room on 05/11/20 where she was noted to have "Splenic rupture with capsular tear at anterior superior border of the spleen. Reactive ascites. No massive hemoperitoneum. Abnormal tissue extending to the hilum, sent for frozen section. Unable to definitively determine if lymphoma on frozen section. Splenectomy performed. No other disease noted within the abdomen."    05/11/2020 splenic lymph node excisional biopsy with pathology months consistent with hairy cell leukemia.    6/3/20 bone marrow biopsy/aspirate with hypercellular marrow % with extensive involvement by hairy cell leukemia    6/22/20 cladribine; 7/6/20 initiated rituximab, not completed due to transfusion reaction    Follow-up     Ms. Yang presents with her partner.  She continues to have pain in back and pelvis (notes fibroid associated pain currently most prominent) which is well controlled with morphine 15 mg b.i.d, prescribed by palliative care.  Diffuse rash has resolved now " "hyperpigmented resolving/scar.  She notes energy is fair, good appetite with stable weight.  No fevers, chills or night sweats.    Review of Systems    ?   A comprehensive 14-point review of systems was reviewed with patient and was negative other than as specified above.   ?     Objective:      Physical Exam      /81   Pulse 105   Temp 98.9 °F (37.2 °C) (Oral)   Resp 17   Ht 5' 11" (1.803 m)   Wt 126.5 kg (278 lb 14.1 oz)   SpO2 98%   BMI 38.90 kg/m²     ?   ECOG:?0   General appearance: Generally well appearing, in no acute distress.   Head, eyes, ears, nose, and throat: Pupils round, sclerae anicteric.  Lymph: No palpable cervical or supraclavicular lymphadenopathy.   Cardiovascular:  Regular rate and rhythm, S1, S2, no audible murmurs.   Respiratory: Lungs clear to auscultation bilaterally.   Abdomen:  Obese, vertical midline incisional scar well-healing  Extremities: Warm, without edema.   Neurologic: Alert and oriented. Grossly normal strength, coordination, and gait.   Skin:  Hyperpigmentation at sites of prior maculopapular rash diffusely, no active lesions.  Is  ?   Laboratory:  ?       Lab Visit on 09/04/2020   Component Date Value Ref Range Status    Sodium 09/04/2020 136  136 - 145 mmol/L Final    Potassium 09/04/2020 4.4  3.5 - 5.1 mmol/L Final    Chloride 09/04/2020 108  95 - 110 mmol/L Final    CO2 09/04/2020 19* 23 - 29 mmol/L Final    Glucose 09/04/2020 90  70 - 110 mg/dL Final    BUN, Bld 09/04/2020 20  6 - 20 mg/dL Final    Creatinine 09/04/2020 1.3  0.5 - 1.4 mg/dL Final    Calcium 09/04/2020 8.6* 8.7 - 10.5 mg/dL Final    Total Protein 09/04/2020 7.6  6.0 - 8.4 g/dL Final    Albumin 09/04/2020 4.1  3.5 - 5.2 g/dL Final    Total Bilirubin 09/04/2020 0.6  0.1 - 1.0 mg/dL Final    Alkaline Phosphatase 09/04/2020 92  55 - 135 U/L Final    AST 09/04/2020 19  10 - 40 U/L Final    ALT 09/04/2020 11  10 - 44 U/L Final    Anion Gap 09/04/2020 9  8 - 16 mmol/L Final    eGFR if "  09/04/2020 58* >60 mL/min/1.73 m^2 Final    eGFR if non African American 09/04/2020 50* >60 mL/min/1.73 m^2 Final    WBC 09/04/2020 4.15  3.90 - 12.70 K/uL Final    RBC 09/04/2020 3.31* 4.00 - 5.40 M/uL Final    Hemoglobin 09/04/2020 10.5* 12.0 - 16.0 g/dL Final    Hematocrit 09/04/2020 31.6* 37.0 - 48.5 % Final    Mean Corpuscular Volume 09/04/2020 96  82 - 98 fL Final    Mean Corpuscular Hemoglobin 09/04/2020 31.7* 27.0 - 31.0 pg Final    Mean Corpuscular Hemoglobin Conc 09/04/2020 33.2  32.0 - 36.0 g/dL Final    RDW 09/04/2020 21.5* 11.5 - 14.5 % Final    Platelets 09/04/2020 451* 150 - 350 K/uL Final    MPV 09/04/2020 9.8  9.2 - 12.9 fL Final    Immature Granulocytes 09/04/2020 0.7* 0.0 - 0.5 % Final    Gran # (ANC) 09/04/2020 2.0  1.8 - 7.7 K/uL Final    Immature Grans (Abs) 09/04/2020 0.03  0.00 - 0.04 K/uL Final    Lymph # 09/04/2020 1.0  1.0 - 4.8 K/uL Final    Mono # 09/04/2020 0.9  0.3 - 1.0 K/uL Final    Eos # 09/04/2020 0.3  0.0 - 0.5 K/uL Final    Baso # 09/04/2020 0.02  0.00 - 0.20 K/uL Final    nRBC 09/04/2020 2* 0 /100 WBC Final    Gran% 09/04/2020 48.9  38.0 - 73.0 % Final    Lymph% 09/04/2020 23.6  18.0 - 48.0 % Final    Mono% 09/04/2020 20.5* 4.0 - 15.0 % Final    Eosinophil% 09/04/2020 6.5  0.0 - 8.0 % Final    Basophil% 09/04/2020 0.5  0.0 - 1.9 % Final    Platelet Estimate 09/04/2020 Increased*  Final    Aniso 09/04/2020 Moderate   Final    Poik 09/04/2020 Slight   Final    Poly 09/04/2020 Moderate   Final    Hypo 09/04/2020 Occasional   Final    Target Cells 09/04/2020 Occasional   Final    Schistocytes 09/04/2020 Present   Final    Pleitez-Jolly Bodies 09/04/2020 Occasional   Final    Differential Method 09/04/2020 Automated   Final      ?     Chemistry        Component Value Date/Time     09/04/2020 1320    K 4.4 09/04/2020 1320     09/04/2020 1320    CO2 19 (L) 09/04/2020 1320    BUN 20 09/04/2020 1320    CREATININE 1.3 09/04/2020  1320    GLU 90 09/04/2020 1320        Component Value Date/Time    CALCIUM 8.6 (L) 09/04/2020 1320    ALKPHOS 92 09/04/2020 1320    AST 19 09/04/2020 1320    ALT 11 09/04/2020 1320    BILITOT 0.6 09/04/2020 1320    ESTGFRAFRICA 58 (A) 09/04/2020 1320    EGFRNONAA 50 (A) 09/04/2020 1320          ?   ?   Imaging:  ?  5/11/20  CT ABDOMEN PELVIS WITHOUT CONTRAST    CLINICAL HISTORY:  Abd pain, fever, abscess suspected;    TECHNIQUE:  Low dose axial images, sagittal and coronal reformations were obtained from the lung bases to the pubic symphysis.  Oral contrast was not administered.    COMPARISON:  05/11/2020    FINDINGS:  Heart: Mild cardiomegaly..  Trace effusion.    Lung Bases: Large left pleural effusion with left lower lobe atelectasis or airspace disease.    Liver: Normal size and attenuation. No focal lesions.    Gallbladder: No calcified gallstones.    Bile Ducts: No dilatation.    Pancreas: No obvious mass. No peripancreatic fat stranding.    Spleen: Splenomegaly measuring up to 17 cm..  Area of decreased attenuation within the lower portion of the spleen concerning for splenic laceration and surrounding perisplenic hematoma.  High attenuating fluid surrounds the spleen.  Active bleed is not excluded on noncontrast imaging.  CTA can be obtained as clinically warranted.    Adrenals: Normal.    Kidneys/Ureters: No mass, hydroureteronephrosis, or nephroureterolithiasis.  4 cm hypodensity within the interpolar region right kidney consistent with a cyst.    Bladder: No wall thickening.    Reproductive organs: Enlarged myomatous uterus is suspected.    GI Tract/Mesentery: No evidence of bowel obstruction or inflammation.  Stranding is seen within the mesentery of the left upper quadrant which could reflect local inflammation or posttraumatic injury.  No evidence of bowel obstruction.  Moderate constipation noted.    Peritoneal Space: Scattered fluid is seen within the pelvis anterior to the uterus as well as within  the cul-de-sac which likely reflects hemoperitoneum.    Retroperitoneum: Small amount of fluid is seen tracking along the left pericolic gutter likely reflecting extension of perisplenic hematoma.  Small amount of fluid is seen within the cul-de-sac.  Small amount of fluid also suspected within the lorrie hepatis region.    Abdominal wall: Normal.    Vasculature: No aneurysm.    Bones: No acute fracture.  No rib fractures are seen.  No suspicious lytic or sclerotic lesions.      Impression       Area of decreased attenuation within the lower portion of the spleen concerning for splenic laceration and surrounding perisplenic hematoma. High attenuating fluid surrounds the spleen. Active bleed is not excluded on noncontrast imaging. CTA can be obtained as clinically warranted.    Splenomegaly.    Scattered fluid within the pelvis likely reflects mild hemoperitoneum.    Stranding is seen within the mesentery of the left upper quadrant which could reflect local inflammation or reflect traumatic injury.           Pathology:    1.  Splenic lymph node (excision):   - Negative for carcinoma   - See separately submitted flow cytometry report   - Review by a hematopathologist will be performed and results issued in a   supplemental report   2.  Spleen (splenectomy):   - Benign spleen with hemorrhage and associated reactive changes  VC       Comment: Interpreted by: Jose Silva M.D., Signed on 05/22/2020 at 11:49   Supplemental Diagnosis 1. SPLENIC LYMPH NODE, EXCISION:   --HAIRY CELL LEUKEMIA (SEE COMMENT).   2. SPLEEN, SPLENECTOMY:   --HAIRY CELL LEUKEMIA (SEE COMMENT).   COMMENT:   1.  Splenic lymph node:   Histologic sections of the specimen show lymph node partially effaced by   diffuse infiltrates of atypical lymphoid cells displaying abundant cytoplasm   and irregular nuclear contour.  Lymphoid follicles are seen.   Flow cytometric analysis of the lymph node detects a kappa light chain   restricted B lymphocyte population  "expressing CD19, CD20, .   CD10 and   CD5 are negative. Flow differential:  Lymphocytes 70.2%, Monocytes 13.4%,   Granulocytes  12.9%, Blast  0.3%, Debris/nRBC 1.9%,  Viability 84.5%.   2.  Spleen:   Histologic sections of the spleen show atypical lymphoid infiltrates in the   right pulp.  The lymphoid cells display abundant cytoplasm and irregular   nuclear contour.  Red blood cell lakes are seen.  A lymph node is identified,   displaying morphologic features cellular to part 1.   Immunohistochemical stains are performed with adequate controls for greater   sensitivity and further architectural assessment.   1).  The atypical lymphoid cells in the lymph nodes are positive for CD20,   BCL2, and cyclin D1 (weak); negative for CD5, CD10, CD23, CD30, and BCL6.   Proliferation index (Ki-67) is moderate.  CD3-positive T lymphocytes are   seen.  -positive plasma cells are polytypic by immunoglobulin kappa and   lambda light chain in situ hybridization study.   2).  The atypical lymphoid cells in the spleen are positive for CD20, cyclin   D1 (weak),  ([facility]), CD25 ([facility]),   BRAF V600E ([facility]); negative for CD5, CD10, and CD23.   Proliferation index (Ki-67) is moderate.  CD3-positive T lymphocytes are seen.   Taken together, the overall findings are consistent with hairy cell leukemia   involving spleen and lymph node.  VC      Gross 1:  Surgery ID:  [id number];  Pathology ID:  [id number]   1.  Received fresh for frozen section labeled "splenic lymph node" is a 1.5 x   0.3 x 0.3 cm piece of yellow fatty tissue.  A portion is submitted in RPMI   for flow cytometry, and a portion is submitted for frozen section diagnosis.   Frozen section remnant is embedded in cassette 1790, 1 FSA.  The remainder is   entirely embedded in cassette 1790, 1B.   Grossed by: Ricardo Wilson    2: Surgery ID:  [id number];  Pathology ID:  [id number]   2.  Received in formalin labeled "spleen" " is an 892 g, 17.8 by 12 x 6.5 cm   spleen.  The capsule is purple blue, smooth, and loosely adhered to spleen,   with moderate amounts of adhered clotted blood. There is a 16 x 13.5 region   on the diaphragmatic surface without capsule. Sectioning reveals subcapsular   hemorrhage, which impacts the splenic parenchyma. The parenchyma displays a   beefy red cut surface.  Within the hilar fat is a 0.7 cm candidate lymph   node.  No distinct masses grossly identified.  Representative sections are   embedded in cassette 1790, 2A-2D.   2A-2B:  Splenic parenchyma with capsule and subcapsular hemorrhage   2C:  Hilar fat and splenic parenchyma without capsule   2D:  Lymph node, bisected            ?   Assessment/Plan:       1. Hairy cell leukemia not having achieved remission    2. Thrombocytosis    3. General medical exam    4. Monocytosis    5. S/P splenectomy          Plan:     # Hairy cell leukemia:  Bone marrow hypercellular (%) with extensive involvement by hairy cell leukemia; flow cytometry from bone marrow consistent with this diagnosis.  I discussed at length natural history of her cell leukemia.  Given her presentation with splenic rupture and extensive involvement with bone marrow with borderline cytopenias (ANC 1.4, hgb 11, plt 120K)we proceeded with treatment with cladribine on 6/22/20; 7/6/20 initiated rituximab, not completed due to transfusion reaction.     She is clinically doing well and significantly improved however lab work with new thrombocytosis and monocytosis.  No iron deficiency Howel Jolly bodies and schistocytes also mention.  No signs or symptoms of active infection or other acute issues at this time, no iron deficiency; this may be concerning for underlying hematologic disease.  I wrote to her as resulted after our clinic visit with recommendation for additional lab work including peripheral blood smear and flow cytometry.  We did discuss plan for repeat bone marrow biopsy planned for  Follow Instructions Provided by your Surgical Team after [birth year] on 09/28/2018 but if concerning findings would recommend earlier.    # maculopapular rash:  Suspected secondary to vancomycin , cannot exclude fentanyl patch, both discontinued some progression of rash.  Significant improvement.    # Diffuse pain:  Notes back and fibroid associated pain although well controlled on morphine 15 mg b.i.d., continue follow-up palliative care.    # ADAMARIS:  Resolved and renal function at baseline today.  Follow-up in Nephrology pending.    # anemia:  Relatively stable.  Prior iron indices within normal limits.  Continue to monitor.    # Leukopenia:  Myelosuppression from disease/therapy, improved, continue monitor.    # Oral infection:  Recommended close follow-up with dental still pending.        Follow-Up:   Additional lab work  Bone marrow biopsy and follow-up after pathology returns

## 2023-11-26 NOTE — DISCHARGE NOTE NURSING/CASE MANAGEMENT/SOCIAL WORK - PATIENT PORTAL LINK FT
You can access the FollowMyHealth Patient Portal offered by [facility] by registering at the following website: [email or web address]. By joining [facility]’s FollowMyHealth portal, you will also be able to view your health information using other applications (apps) compatible with our system.

## 2023-11-26 NOTE — DISCHARGE NOTE PROVIDER - HOSPITAL COURSE
ARAVIND QUEVEDO is a 28F with PSHx of robotic RYGB (February 2022 [facility]), laparoscopic appendectomy (August 2023, [facility]) presenting with midepigastric and RUQ abdominal pain, cholelithiasis, transaminitis concerning for choledocholithiasis vs passed stone. CT and US showing cholelithiasis and periportal/periductal edema. Patient was taken to the OR for a Laparoscopic cholecystectomy with IOC. In the immediate postop period, patient reported adequate pain and po tolerance. On POD1 there was an adequate progression with pain control, diet tolerance and normal labs. Patient ready for discharge.    At time of discharge, pt was tolerating a regular diet, voiding/stooling spontaneously, ambulating, and pain was well-controlled. Patient felt ready for discharge.

## 2023-11-26 NOTE — PROGRESS NOTE ADULT - SUBJECTIVE AND OBJECTIVE BOX
B TEAM SURGERY DAILY PROGRESS NOTE:     INTERVAL EVENTS: None    SUBJECTIVE/ROS: No acute events overnight. Patient seen and examined at bedside by surgical team. Denies N/V, sob/chest pain, fever or chills. Patient noted adequate pain control and tolerating diet.      OBJECTIVE:  Vital Signs Last 24 Hrs  T(C): 36.5 (26 Nov 2023 06:00), Max: 37.1 (25 Nov 2023 16:15)  T(F): 97.7 (26 Nov 2023 06:00), Max: 98.8 (25 Nov 2023 16:15)  HR: 55 (26 Nov 2023 06:00) (52 - 86)  BP: 110/65 (26 Nov 2023 06:00) (104/65 - 127/81)  BP(mean): 82 (25 Nov 2023 17:45) (77 - 97)  RR: 17 (26 Nov 2023 06:00) (15 - 20)  SpO2: 100% (26 Nov 2023 06:00) (96% - 100%)    Parameters below as of 26 Nov 2023 06:00  Patient On (Oxygen Delivery Method): room air                            10.6   6.19  )-----------( 316      ( 26 Nov 2023 07:31 )             32.6     11-26    133<L>  |  100  |  8   ----------------------------<  122<H>  4.0   |  24  |  0.71    Ca    9.0      26 Nov 2023 07:31  Phos  3.2     11-26  Mg     1.90     11-26    TPro  7.0  /  Alb  3.5  /  TBili  0.3  /  DBili  x   /  AST  250<H>  /  ALT  405<H>  /  AlkPhos  275<H>  11-26   PT/INR - ( 25 Nov 2023 00:30 )   PT: 12.3 sec;   INR: 1.09 ratio         PTT - ( 25 Nov 2023 00:30 )  PTT:25.4 sec  I&O's Detail    25 Nov 2023 07:01  -  26 Nov 2023 07:00  --------------------------------------------------------  IN:    Lactated Ringers: 675 mL    Oral Fluid: 720 mL  Total IN: 1395 mL    OUT:  Total OUT: 0 mL    Total NET: 1395 mL          IMAGING:      PHYSICAL EXAM:  Constitutional: NAD  Respiratory: non-labored breathing, patent airway  Gastrointestinal: abdomen soft, nontender, nondistended, incisions c/d/i.  Extremities: warm  Neurological: intact

## 2023-11-26 NOTE — DISCHARGE NOTE PROVIDER - CARE PROVIDER_API CALL
Hazel Oneal [facility]  Surgery  [address] [place] [id number]  Phone: [phone]  Fax: [phone]  Follow Up Time: 2 weeks

## 2023-11-26 NOTE — DISCHARGE NOTE NURSING/CASE MANAGEMENT/SOCIAL WORK - NSDCPEFALRISK_GEN_ALL_CORE
For information on Fall & Injury Prevention, visit: [email or web address] OR  [email or web address] OR  https://www.cdc.gov/steadi/patient.html

## 2023-11-26 NOTE — DISCHARGE NOTE PROVIDER - NSDCFUADDINST_GEN_ALL_CORE_FT
Activity: No heavy lifting > 10 lbs for 2 weeks. Avoid straining or excessive activity until follow up in 2 weeks with Dr. Oneal.     Dressings: Remove outer dressings in 48 hours and steri strips underneath will fall off on their own. Do not scrub wounds. You may shower but do not bathe. May use ice packs for pain and swelling.     Pain control: You may take over-the-counter tylenol and motrin three times per day with food for up to 5 days. Limit the use of narcotics (oxycodone) for breakthrough pain.    Follow up: Please call the number provided to make an appointment with Dr. Oneal in 1-2 weeks. Please call with any questions or concerns including fevers, worsening pain, pus from the wounds, or redness of the skin.

## 2023-11-26 NOTE — DISCHARGE NOTE PROVIDER - NSDCMRMEDTOKEN_GEN_ALL_CORE_FT
acetaminophen 650 mg oral tablet, extended release: 1 tab(s) orally every 6 hours  ibuprofen 400 mg oral tablet: 1 tab(s) orally every 8 hours as needed for  moderate pain  oxyCODONE 5 mg oral tablet: 1 tab(s) orally every 6 hours as needed for  severe pain MDD: 4

## 2023-11-26 NOTE — PROGRESS NOTE ADULT - ASSESSMENT
28F hx robotic RYGB (February 2022 [facility]), laparoscopic appendectomy (August 2023, [facility]) presenting with midepigastric and RUQ abdominal pain, cholelithiasis, transaminitis concerning for choledocholithiasis vs passed stone. CT and US showing cholelithiasis and periportal/periductal edema. No signs of cholecystitis, no leukocytosis or fevers to suggest cholangitis. Pt currently s/p Lap cholecystectomy with IOC. Adequate progression with pain control, diet tolerance and normal labs. Patient ready for discharge.    PLAN:  - Discharge today  - Reg diet  - Pain meds PO  - Ambulate/OOB  - DVTppx: SCD and Lovenox    B Team Surgery [id number]

## 2023-12-05 PROBLEM — Z78.9 OTHER SPECIFIED HEALTH STATUS: Chronic | Status: ACTIVE | Noted: 2023-11-25

## 2023-12-11 LAB
SURGICAL PATHOLOGY STUDY: SIGNIFICANT CHANGE UP
SURGICAL PATHOLOGY STUDY: SIGNIFICANT CHANGE UP

## 2023-12-14 ENCOUNTER — APPOINTMENT (OUTPATIENT)
Dept: TRAUMA SURGERY | Facility: CLINIC | Age: 28
End: 2023-12-14
Payer: SELF-PAY

## 2023-12-14 ENCOUNTER — OUTPATIENT (OUTPATIENT)
Dept: OUTPATIENT SERVICES | Facility: HOSPITAL | Age: 28
LOS: 1 days | End: 2023-12-14

## 2023-12-14 VITALS
HEIGHT: 68 IN | WEIGHT: 180 LBS | OXYGEN SATURATION: 99 % | HEART RATE: 81 BPM | SYSTOLIC BLOOD PRESSURE: 110 MMHG | BODY MASS INDEX: 27.28 KG/M2 | DIASTOLIC BLOOD PRESSURE: 60 MMHG

## 2023-12-14 DIAGNOSIS — Z90.49 ACQUIRED ABSENCE OF OTHER SPECIFIED PARTS OF DIGESTIVE TRACT: Chronic | ICD-10-CM

## 2023-12-14 DIAGNOSIS — Z98.84 BARIATRIC SURGERY STATUS: Chronic | ICD-10-CM

## 2023-12-14 PROBLEM — Z00.00 ENCOUNTER FOR PREVENTIVE HEALTH EXAMINATION: Status: ACTIVE | Noted: 2023-12-14

## 2023-12-14 PROCEDURE — 99024 POSTOP FOLLOW-UP VISIT: CPT

## 2023-12-14 NOTE — ASSESSMENT
[FreeTextEntry1] : 27yo F s/p lap zayra and IOC on 11/25 presents for routine follow up, recovering as expected. L periumbilical burning likely trapped cutaneous nerve, improving over the past weeks. Will likely resolve. Pt encouraged to call if it does not.

## 2023-12-14 NOTE — HISTORY OF PRESENT ILLNESS
[de-identified] : 29yo F s/p lap zayra and IOC on 11/25 presents for routine follow up. Pt reports occasional periumbilical burning pain to the L of the umbilicus. It occurs sporadically, without relation to movement or eating. Typically resolves spontaneously or with motrin. Seems less frequent this week compared to last - overall improving. Otherwise feels well, denies other pain. Tolerating regular diet, having regular bowel movements, no fevers/chills.

## 2023-12-14 NOTE — PHYSICAL EXAM
[No Rash or Lesion] : No rash or lesion [Calm] : calm [de-identified] : NAD [de-identified] : Soft, nondistended, minimal tenderness to L of umbilicus, no palpable mass or hernias, incisions c/d/i (old keloids).  [de-identified] : No deformities

## 2023-12-15 DIAGNOSIS — K81.0 ACUTE CHOLECYSTITIS: ICD-10-CM

## 2024-01-31 ENCOUNTER — NON-APPOINTMENT (OUTPATIENT)
Age: 29
End: 2024-01-31

## 2024-02-24 ENCOUNTER — TRANSCRIPTION ENCOUNTER (OUTPATIENT)
Age: 29
End: 2024-02-24

## 2024-02-25 ENCOUNTER — INPATIENT (INPATIENT)
Facility: HOSPITAL | Age: 29
LOS: 0 days | Discharge: HOME | End: 2024-02-26
Attending: STUDENT IN AN ORGANIZED HEALTH CARE EDUCATION/TRAINING PROGRAM | Admitting: STUDENT IN AN ORGANIZED HEALTH CARE EDUCATION/TRAINING PROGRAM
Payer: MEDICAID

## 2024-02-25 VITALS
DIASTOLIC BLOOD PRESSURE: 85 MMHG | RESPIRATION RATE: 16 BRPM | HEART RATE: 58 BPM | OXYGEN SATURATION: 100 % | SYSTOLIC BLOOD PRESSURE: 133 MMHG | TEMPERATURE: 98 F

## 2024-02-25 DIAGNOSIS — K46.0 UNSPECIFIED ABDOMINAL HERNIA WITH OBSTRUCTION, WITHOUT GANGRENE: ICD-10-CM

## 2024-02-25 DIAGNOSIS — Z90.49 ACQUIRED ABSENCE OF OTHER SPECIFIED PARTS OF DIGESTIVE TRACT: Chronic | ICD-10-CM

## 2024-02-25 DIAGNOSIS — Z98.84 BARIATRIC SURGERY STATUS: Chronic | ICD-10-CM

## 2024-02-25 LAB
ALBUMIN SERPL ELPH-MCNC: 4.1 G/DL — SIGNIFICANT CHANGE UP (ref 3.3–5)
ALP SERPL-CCNC: 155 U/L — HIGH (ref 40–120)
ALT FLD-CCNC: 17 U/L — SIGNIFICANT CHANGE UP (ref 4–33)
ANION GAP SERPL CALC-SCNC: 13 MMOL/L — SIGNIFICANT CHANGE UP (ref 7–14)
APPEARANCE UR: CLEAR — SIGNIFICANT CHANGE UP
APTT BLD: 28.4 SEC — SIGNIFICANT CHANGE UP (ref 24.5–35.6)
AST SERPL-CCNC: 28 U/L — SIGNIFICANT CHANGE UP (ref 4–32)
BACTERIA # UR AUTO: NEGATIVE /HPF — SIGNIFICANT CHANGE UP
BASE EXCESS BLDV CALC-SCNC: 0.2 MMOL/L — SIGNIFICANT CHANGE UP (ref -2–3)
BASOPHILS # BLD AUTO: 0.03 K/UL — SIGNIFICANT CHANGE UP (ref 0–0.2)
BASOPHILS NFR BLD AUTO: 0.5 % — SIGNIFICANT CHANGE UP (ref 0–2)
BILIRUB SERPL-MCNC: 0.3 MG/DL — SIGNIFICANT CHANGE UP (ref 0.2–1.2)
BILIRUB UR-MCNC: NEGATIVE — SIGNIFICANT CHANGE UP
BLD GP AB SCN SERPL QL: NEGATIVE — SIGNIFICANT CHANGE UP
BUN SERPL-MCNC: 10 MG/DL — SIGNIFICANT CHANGE UP (ref 7–23)
CA-I SERPL-SCNC: 1.24 MMOL/L — SIGNIFICANT CHANGE UP (ref 1.15–1.33)
CALCIUM SERPL-MCNC: 9.5 MG/DL — SIGNIFICANT CHANGE UP (ref 8.4–10.5)
CAST: 0 /LPF — SIGNIFICANT CHANGE UP (ref 0–4)
CHLORIDE BLDV-SCNC: 104 MMOL/L — SIGNIFICANT CHANGE UP (ref 96–108)
CHLORIDE SERPL-SCNC: 103 MMOL/L — SIGNIFICANT CHANGE UP (ref 98–107)
CO2 BLDV-SCNC: 25.8 MMOL/L — SIGNIFICANT CHANGE UP (ref 22–26)
CO2 SERPL-SCNC: 23 MMOL/L — SIGNIFICANT CHANGE UP (ref 22–31)
COLOR SPEC: YELLOW — SIGNIFICANT CHANGE UP
CREAT SERPL-MCNC: 0.86 MG/DL — SIGNIFICANT CHANGE UP (ref 0.5–1.3)
DIFF PNL FLD: NEGATIVE — SIGNIFICANT CHANGE UP
EGFR: 95 ML/MIN/1.73M2 — SIGNIFICANT CHANGE UP
EOSINOPHIL # BLD AUTO: 0.07 K/UL — SIGNIFICANT CHANGE UP (ref 0–0.5)
EOSINOPHIL NFR BLD AUTO: 1.1 % — SIGNIFICANT CHANGE UP (ref 0–6)
GAS PNL BLDV: 135 MMOL/L — LOW (ref 136–145)
GAS PNL BLDV: SIGNIFICANT CHANGE UP
GLUCOSE BLDV-MCNC: 109 MG/DL — HIGH (ref 70–99)
GLUCOSE SERPL-MCNC: 102 MG/DL — HIGH (ref 70–99)
GLUCOSE UR QL: NEGATIVE MG/DL — SIGNIFICANT CHANGE UP
HCG SERPL-ACNC: <1 MIU/ML — SIGNIFICANT CHANGE UP
HCO3 BLDV-SCNC: 25 MMOL/L — SIGNIFICANT CHANGE UP (ref 22–29)
HCT VFR BLD CALC: 34.3 % — LOW (ref 34.5–45)
HCT VFR BLDA CALC: 34 % — LOW (ref 34.5–46.5)
HGB BLD CALC-MCNC: 11.4 G/DL — LOW (ref 11.7–16.1)
HGB BLD-MCNC: 10.9 G/DL — LOW (ref 11.5–15.5)
IANC: 4.58 K/UL — SIGNIFICANT CHANGE UP (ref 1.8–7.4)
IMM GRANULOCYTES NFR BLD AUTO: 0.3 % — SIGNIFICANT CHANGE UP (ref 0–0.9)
INR BLD: 1.05 RATIO — SIGNIFICANT CHANGE UP (ref 0.85–1.18)
KETONES UR-MCNC: 15 MG/DL
LACTATE BLDV-MCNC: 2.2 MMOL/L — HIGH (ref 0.5–2)
LEUKOCYTE ESTERASE UR-ACNC: NEGATIVE — SIGNIFICANT CHANGE UP
LIDOCAIN IGE QN: 44 U/L — SIGNIFICANT CHANGE UP (ref 7–60)
LYMPHOCYTES # BLD AUTO: 1.32 K/UL — SIGNIFICANT CHANGE UP (ref 1–3.3)
LYMPHOCYTES # BLD AUTO: 20.9 % — SIGNIFICANT CHANGE UP (ref 13–44)
MCHC RBC-ENTMCNC: 28.5 PG — SIGNIFICANT CHANGE UP (ref 27–34)
MCHC RBC-ENTMCNC: 31.8 GM/DL — LOW (ref 32–36)
MCV RBC AUTO: 89.8 FL — SIGNIFICANT CHANGE UP (ref 80–100)
MONOCYTES # BLD AUTO: 0.29 K/UL — SIGNIFICANT CHANGE UP (ref 0–0.9)
MONOCYTES NFR BLD AUTO: 4.6 % — SIGNIFICANT CHANGE UP (ref 2–14)
NEUTROPHILS # BLD AUTO: 4.58 K/UL — SIGNIFICANT CHANGE UP (ref 1.8–7.4)
NEUTROPHILS NFR BLD AUTO: 72.6 % — SIGNIFICANT CHANGE UP (ref 43–77)
NITRITE UR-MCNC: NEGATIVE — SIGNIFICANT CHANGE UP
NRBC # BLD: 0 /100 WBCS — SIGNIFICANT CHANGE UP (ref 0–0)
NRBC # FLD: 0 K/UL — SIGNIFICANT CHANGE UP (ref 0–0)
PCO2 BLDV: 38 MMHG — LOW (ref 39–52)
PH BLDV: 7.42 — SIGNIFICANT CHANGE UP (ref 7.32–7.43)
PH UR: 7 — SIGNIFICANT CHANGE UP (ref 5–8)
PLATELET # BLD AUTO: 396 K/UL — SIGNIFICANT CHANGE UP (ref 150–400)
PO2 BLDV: 26 MMHG — SIGNIFICANT CHANGE UP (ref 25–45)
POTASSIUM BLDV-SCNC: 4.1 MMOL/L — SIGNIFICANT CHANGE UP (ref 3.5–5.1)
POTASSIUM SERPL-MCNC: 3.9 MMOL/L — SIGNIFICANT CHANGE UP (ref 3.5–5.3)
POTASSIUM SERPL-SCNC: 3.9 MMOL/L — SIGNIFICANT CHANGE UP (ref 3.5–5.3)
PROT SERPL-MCNC: 7.2 G/DL — SIGNIFICANT CHANGE UP (ref 6–8.3)
PROT UR-MCNC: NEGATIVE MG/DL — SIGNIFICANT CHANGE UP
PROTHROM AB SERPL-ACNC: 11.8 SEC — SIGNIFICANT CHANGE UP (ref 9.5–13)
RBC # BLD: 3.82 M/UL — SIGNIFICANT CHANGE UP (ref 3.8–5.2)
RBC # FLD: 13.3 % — SIGNIFICANT CHANGE UP (ref 10.3–14.5)
RBC CASTS # UR COMP ASSIST: 1 /HPF — SIGNIFICANT CHANGE UP (ref 0–4)
RH IG SCN BLD-IMP: POSITIVE — SIGNIFICANT CHANGE UP
SAO2 % BLDV: 40.3 % — LOW (ref 67–88)
SODIUM SERPL-SCNC: 139 MMOL/L — SIGNIFICANT CHANGE UP (ref 135–145)
SP GR SPEC: 1.02 — SIGNIFICANT CHANGE UP (ref 1–1.03)
SQUAMOUS # UR AUTO: 1 /HPF — SIGNIFICANT CHANGE UP (ref 0–5)
UROBILINOGEN FLD QL: 1 MG/DL — SIGNIFICANT CHANGE UP (ref 0.2–1)
WBC # BLD: 6.31 K/UL — SIGNIFICANT CHANGE UP (ref 3.8–10.5)
WBC # FLD AUTO: 6.31 K/UL — SIGNIFICANT CHANGE UP (ref 3.8–10.5)
WBC UR QL: 0 /HPF — SIGNIFICANT CHANGE UP (ref 0–5)

## 2024-02-25 PROCEDURE — 71046 X-RAY EXAM CHEST 2 VIEWS: CPT | Mod: 26

## 2024-02-25 PROCEDURE — 58660 LAPAROSCOPY LYSIS: CPT | Mod: GC

## 2024-02-25 PROCEDURE — 74177 CT ABD & PELVIS W/CONTRAST: CPT | Mod: 26,MC

## 2024-02-25 PROCEDURE — 44050 REDUCE BOWEL OBSTRUCTION: CPT | Mod: GC

## 2024-02-25 PROCEDURE — 99291 CRITICAL CARE FIRST HOUR: CPT | Mod: 25

## 2024-02-25 PROCEDURE — 99222 1ST HOSP IP/OBS MODERATE 55: CPT | Mod: 57,GC,25

## 2024-02-25 RX ORDER — IOHEXOL 300 MG/ML
30 INJECTION, SOLUTION INTRAVENOUS ONCE
Refills: 0 | Status: COMPLETED | OUTPATIENT
Start: 2024-02-25 | End: 2024-02-25

## 2024-02-25 RX ORDER — HYDROMORPHONE HYDROCHLORIDE 2 MG/ML
1 INJECTION INTRAMUSCULAR; INTRAVENOUS; SUBCUTANEOUS ONCE
Refills: 0 | Status: DISCONTINUED | OUTPATIENT
Start: 2024-02-25 | End: 2024-02-25

## 2024-02-25 RX ORDER — ENOXAPARIN SODIUM 100 MG/ML
40 INJECTION SUBCUTANEOUS EVERY 24 HOURS
Refills: 0 | Status: DISCONTINUED | OUTPATIENT
Start: 2024-02-25 | End: 2024-02-26

## 2024-02-25 RX ORDER — ONDANSETRON 8 MG/1
4 TABLET, FILM COATED ORAL ONCE
Refills: 0 | Status: COMPLETED | OUTPATIENT
Start: 2024-02-25 | End: 2024-02-25

## 2024-02-25 RX ORDER — SODIUM CHLORIDE 9 MG/ML
1000 INJECTION INTRAMUSCULAR; INTRAVENOUS; SUBCUTANEOUS ONCE
Refills: 0 | Status: COMPLETED | OUTPATIENT
Start: 2024-02-25 | End: 2024-02-25

## 2024-02-25 RX ORDER — FAMOTIDINE 10 MG/ML
20 INJECTION INTRAVENOUS ONCE
Refills: 0 | Status: COMPLETED | OUTPATIENT
Start: 2024-02-25 | End: 2024-02-25

## 2024-02-25 RX ORDER — SODIUM CHLORIDE 9 MG/ML
1000 INJECTION, SOLUTION INTRAVENOUS
Refills: 0 | Status: DISCONTINUED | OUTPATIENT
Start: 2024-02-25 | End: 2024-02-26

## 2024-02-25 RX ORDER — MORPHINE SULFATE 50 MG/1
4 CAPSULE, EXTENDED RELEASE ORAL ONCE
Refills: 0 | Status: DISCONTINUED | OUTPATIENT
Start: 2024-02-25 | End: 2024-02-25

## 2024-02-25 RX ORDER — ACETAMINOPHEN 500 MG
1000 TABLET ORAL ONCE
Refills: 0 | Status: COMPLETED | OUTPATIENT
Start: 2024-02-25 | End: 2024-02-25

## 2024-02-25 RX ADMIN — HYDROMORPHONE HYDROCHLORIDE 1 MILLIGRAM(S): 2 INJECTION INTRAMUSCULAR; INTRAVENOUS; SUBCUTANEOUS at 22:22

## 2024-02-25 RX ADMIN — HYDROMORPHONE HYDROCHLORIDE 1 MILLIGRAM(S): 2 INJECTION INTRAMUSCULAR; INTRAVENOUS; SUBCUTANEOUS at 21:41

## 2024-02-25 RX ADMIN — MORPHINE SULFATE 4 MILLIGRAM(S): 50 CAPSULE, EXTENDED RELEASE ORAL at 18:21

## 2024-02-25 RX ADMIN — ONDANSETRON 4 MILLIGRAM(S): 8 TABLET, FILM COATED ORAL at 17:38

## 2024-02-25 RX ADMIN — SODIUM CHLORIDE 1000 MILLILITER(S): 9 INJECTION INTRAMUSCULAR; INTRAVENOUS; SUBCUTANEOUS at 17:38

## 2024-02-25 RX ADMIN — MORPHINE SULFATE 4 MILLIGRAM(S): 50 CAPSULE, EXTENDED RELEASE ORAL at 18:20

## 2024-02-25 RX ADMIN — IOHEXOL 30 MILLILITER(S): 300 INJECTION, SOLUTION INTRAVENOUS at 18:21

## 2024-02-25 RX ADMIN — FAMOTIDINE 20 MILLIGRAM(S): 10 INJECTION INTRAVENOUS at 20:43

## 2024-02-25 RX ADMIN — Medication 400 MILLIGRAM(S): at 17:38

## 2024-02-25 RX ADMIN — ONDANSETRON 4 MILLIGRAM(S): 8 TABLET, FILM COATED ORAL at 21:40

## 2024-02-25 RX ADMIN — Medication 30 MILLILITER(S): at 20:42

## 2024-02-25 RX ADMIN — Medication 1000 MILLIGRAM(S): at 18:20

## 2024-02-25 NOTE — ED ADULT TRIAGE NOTE - PAIN RATING/NUMBER SCALE (0-10): ACTIVITY
Patient:   GEN HERNANDEZ            MRN: [id number]            FIN: [id number]              Age:   55 years     Sex:  FEMALE     :  [birth year]   Associated Diagnoses:   None   Author:   JAREK SNYDER     Chief Complaint  flank pain  PCP Forys  History of Present Illness  55F with hx of nephrolithiasis s/p lithotripsy now presents with flank pain. started a few hours PTA, has flank pain, has nausea, has some bloody urine, no fever, has body aches.  In the ER,  Result title:  CT ABDOMEN AND PELVIS WO CON IMPRESSION: 3 mm stone at the left ureterovesical junction causing mild obstruction.  There are additional small stones in each kidney.  on my eval on the floor, patient was in severe pain and had emesis nb/nb.  patient rec'd toradol and morphin in ER which did not help per patient, she appeared miserable and had quivering  of her lip de to the pain. dilaudid given and patients symptoms improved.  oxygen also started for symptomatic relief.  Review of Systems  Constitutional symptoms:  no Sweats, No weight loss, no fever, no chills, + fatigue.   Skin symptoms:  No rash,    Eye symptoms:  No recent vision problems, no pain.    ENMT symptoms:  No ear pain, no sore throat, no nasal congestion.   Respiratory symptoms:  No shortness of breath, no cough, no wheezing.   Cardiovascular symptoms:  no syncope, No chest pain,    Gastrointestinal symptoms:  no Nausea, +flank abdominal pain, no vomiting, no diarrhea.   Genitourinary symptoms:  No dysuria,    Musculoskeletal symptoms:  No joint swelling, No Joint pain,    Neurologic symptoms:  no Dizziness, no headache, no altered level of consciousness, no numbness, no tingling, no weakness.   Hematologic/Lymphatic symptoms:  no unusual bruising or bleeding.  Additional review of systems information: All other systems reviewed and otherwise negative  Problem List/Past Medical History  Flank Pain  Vaginal bleeding between periods  Procedure/Surgical  History  lithotripsy  Medications  Home Medications (3) Active  Alphagan P 0.1% ophthalmic solution 1 drop, Left Eye, BID  Lumigan 0.01% ophthalmic solution 1 drop, Each Eye, Q Bedtime  Unknown Eye Drop 1 drop, Left Eye, Q Bedtime    Medications (1) Active  Scheduled: (0)  Continuous: (1)  Lactated Ringers 1000 mL  1,000 mL, IV, 125 mL/hr  PRN: (0)       Allergies (2) Active Reaction  metFORMIN Deal-Melchor syndrome  sulfa drugs Hives    Social History   No SANDEEP     Family History   Non contrib      Physical Exam    Vitals between:   18-JAN-2019 22:34:58   TO   19-JAN-2019 22:34:58                   LAST RESULT MINIMUM MAXIMUM  Temperature 36.7 36.3 36.7  Heart Rate 88 88 110  Respiratory Rate 14 14 24  NISBP           166 166 193  NIDBP           101 82 114  NIMBP           123 117 140  SpO2                    96 96 100    General: Alert and oriented ×4.  mod distress. Cooperative._  HEENT: ncat eomi  Neck: Supple. _  Respiratory: Clear bilaterally._  Cardiovascular: Regular rate and rhythm.  No murmurs._  Abdominal: Soft, NT, ND. +flank pain  Musculoskeletal: No edema._    Lab Results    Labs between:  18-JAN-2019 22:34 to 19-JAN-2019 22:34    CBC:                 WBC  HgB  Hct  Plt  MCV  RDW   19-JAN-2019 9.2  13.7  43.1  319  87.2  13.3     DIFF:                 Seg  Neutroph//ABS  Lymph//ABS  Mono//ABS  EOS/ABS  19-JAN-2019 NOT APPLICABLE  63 // 5.9 27 // 2.5 6 // 0.6 3 // 0.2    BMP:                 Na  Cl  BUN  Glu   19-JAN-2019 142  (H) 108  12  (H) 154                              K  CO2  Cr  Ca                              3.8  26  0.65  9.0                    Diagnostic Results    Result title:  CT ABDOMEN AND PELVIS WO CON  Result status:  Final  Verified by:  KWASI, NELSON ORTEGA on 01/19/2019 0:27  IMPRESSION: 3 mm stone at the left ureterovesical junction causing mild obstruction.  There are additional small stones in each kidney.    Assessment/Plan  55F with hx of nephrolithiasis s/p lithotripsy now  presents with flank pain.  # 3mm L-UVJ stone with renal colic and obstructive uropathy with possible UTI  - cont prn pain control  - cont IVF  - cont antiemetics  - cont rocephin  - await cx report  - urology following  - cld for now  SCD  full code    Lawrence Allison MD  Hospitalist, [facility]   4 (moderate pain)

## 2024-02-25 NOTE — ED ADULT NURSE NOTE - NS ED NURSE DISCH DISPOSITION
Gama Davenport is a 60 y.o. female presents in office for    Chief Complaint   Patient presents with    Rapid Heart Rate     Pt reports pounding heart beat while laying down and resting; x2 weeks        Visit Vitals  /80 (BP 1 Location: Left arm, BP Patient Position: Sitting)   Pulse 67   Resp 16   Ht 5' 5\" (1.651 m)   Wt 71.2 kg (157 lb)   SpO2 99%   BMI 26.13 kg/m²         Health Maintenance Due   Topic Date Due    Hepatitis C Screening  1955    DTaP/Tdap/Td series (1 - Tdap) 03/18/1968    PAP AKA CERVICAL CYTOLOGY  03/18/1978    Shingrix Vaccine Age 50> (1 of 2) 03/18/2007    BREAST CANCER SCRN MAMMOGRAM  03/18/2007    FOBT Q 1 YEAR AGE 50-75  03/18/2007    Influenza Age 5 to Adult  08/01/2019             1. Have you been to the ER, urgent care clinic since your last visit? Hospitalized since your last visit? No    2. Have you seen or consulted any other health care providers outside of the [address] [place] [address] since your last visit? Include any pap smears or colon screening. No    3 most recent PHQ Screens 1/14/2020   Little interest or pleasure in doing things Several days   Feeling down, depressed, irritable, or hopeless Several days   Total Score PHQ 2 2       Abuse Screening Questionnaire 1/14/2020   Do you ever feel afraid of your partner? N   Are you in a relationship with someone who physically or mentally threatens you? N   Is it safe for you to go home? Y       Fall Risk Assessment, last 12 mths 1/14/2020   Able to walk? Yes   Fall in past 12 months?  No       Learning Assessment 10/24/2014   PRIMARY LEARNER Patient   CO-LEARNER CAREGIVER No   PRIMARY LANGUAGE ENGLISH   LEARNER PREFERENCE PRIMARY LISTENING   ANSWERED BY patient   RELATIONSHIP SELF Admitted

## 2024-02-25 NOTE — ED PROVIDER NOTE - OBJECTIVE STATEMENT
28-year-old female with past medical history significant for gastric bypass in 2021 appendectomy in 2023 August and cholecystectomy also in 2023 presents to the ED with complaints of severe epigastric pain.  Patient states that pain began 3 days ago and has steadily increased to the 10 out of 10 she is now experiencing.  Pain is sharp epigastric pain, radiating to BL back. Pain is associated with nausea, vomiting.  She is unable to get comfortable, but the most comfortable position is sitting upright and leaning forward, lying back is near impossible. Has only taken 2 tums for symptoms. Unable to tolerate PO since yesterday. Able to pass gas. Last BM this morning. No diarrhea or constipation.  No chest pain or shortness of breath. No dysuria. No black or bloody stool.

## 2024-02-25 NOTE — ED ADULT NURSE NOTE - OBJECTIVE STATEMENT
Pt received to intake. Pt is A&Ox3, ambulatory, Hx of gastric bypass, appendectomy, gall bladder surgery (11/2023). Pt presents to ED c/o abdominal pain that radiates to back that began today after eating. +nausea and dry-heaves. denies chest pain, SOB, headache, fevers/chills. breathing is even and nonlabored. Stretcher set in lowest position, safety maintained. Pt received to intake. Pt is A&Ox3, ambulatory, Hx of gastric bypass, appendectomy, gall bladder surgery (11/2023). Pt presents to ED c/o abdominal pain that radiates to back that began today after eating. +nausea and dry-heaves. denies chest pain, SOB, headache, fevers/chills. breathing is even and nonlabored. left AC 20g IV placed, labs collected and sent. medications administered as ordered. awaiting imaging. Stretcher set in lowest position, safety maintained.

## 2024-02-25 NOTE — H&P ADULT - HISTORY OF PRESENT ILLNESS
ACUTE CARE SURGERY CONSULT NOTE  --------------------------------------------------------------------------------------------    Patient is a 28y old  Female who presents with a chief complaint of abdominal pain     HPI: 28F prior lap rnygb in , lap appy , lap zayra  presents with 12 hours of acute onset epigastric pain in a belt like distribution.  Associated nausea, and vomiting, no bloody bm, denies obstructive symptoms. No fevers or chills.  CT scan c/f internal hernia with sma occlusion.        ROS: 10-system review is otherwise negative except HPI above.      PAST MEDICAL & SURGICAL HISTORY:  No pertinent past medical history      History of appendectomy      H/O gastric bypass      S/P cholecystectomy        FAMILY HISTORY:  No pertinent family history in first degree relatives        SOCIAL HISTORY:      ALLERGIES: No Known Allergies      HOME MEDICATIONS:     CURRENT MEDICATIONS  MEDICATIONS (STANDING): enoxaparin Injectable 40 milliGRAM(s) SubCutaneous every 24 hours  lactated ringers. 1000 milliLiter(s) IV Continuous <Continuous>    MEDICATIONS (PRN):aluminum hydroxide/magnesium hydroxide/simethicone Suspension 30 milliLiter(s) Oral every 4 hours PRN Dyspepsia    --------------------------------------------------------------------------------------------    Vitals:   T(C): 36.5 (24 @ 23:12), Max: 36.7 (24 @ 17:02)  HR: 60 (24 @ 23:12) (58 - 89)  BP: 129/82 (24 @ 23:12) (123/82 - 137/86)  RR: 18 (24 @ 23:12) (15 - 20)  SpO2: 98% (24 23:12) (95% - 100%)  CAPILLARY BLOOD GLUCOSE        --------------------------------------------------------------------------------------------    LABS  CBC (:39)                              10.9<L>                         6.31    )----------------(  396        72.6  % Neutrophils, 20.9  % Lymphocytes, ANC: 4.58                                34.3<L>    BMP ( 17:39)             139     |  103     |  10    		Ca++ --      Ca 9.5                ---------------------------------( 102<H>		Mg --                 3.9     |  23      |  0.86  			Ph --        LFTs ( 17:39)      TPro 7.2 / Alb 4.1 / TBili 0.3 / DBili -- / AST 28 / ALT 17 / AlkPhos 155<H>    Coags ( @ 23:18)  aPTT 28.4 / INR 1.05 / PT 11.8        VBG ( 17:39)     7.42 / 38<L> / 26 / 25 / 0.2 / 40.3<L>%     Lactate: 2.2<H>    --------------------------------------------------------------------------------------------    MICROBIOLOGY  Urinalysis ( @ 18:03):     Color: Yellow / Appearance: Clear / S.025 / pH: 7.0 / Gluc: Negative / Ketones: 15<!> / Bili: Negative / Urobili: 1.0 / Protein :Negative / Nitrites: Negative / Leuk.Est: Negative / RBC: 1 / WBC: 0 / Sq Epi:  / Non Sq Epi: 1 / Bacteria Negative

## 2024-02-25 NOTE — H&P ADULT - ATTENDING COMMENTS
I have reviewed the history, pertinent labs and imaging, and discussed the care with the consult resident.  More than 50% of this 55 minute encounter including face to face with the patient was spent counseling and/or coordination of care on internal hernia.  Time included review of vitals, labs, imaging, discussion with consultants and coordination with the operating room/emergency department.    27yo F with h/o lap gastric bypass presents with abdominal pain, not clinically obstructed. Normal WBC, lactate 2.2. CT with swirl and mesenteric venous congestion, concerning for internal hernia. Recommended urgent diagnostic laparoscopic for internal hernia, pt agreed.     - NPO  - IVF resuscitation   - Urgent diagnostic laparoscopy, possible ex lap  - thiamine/folate     The active issues are:  1. internal hernia     The Acute Care Surgery (B Team) Attending Group Practice:  Dr. Hazel Oneal    urgent issues - spectra [phone]  nonurgent issues - [phone]  patient appointments or afterhours - [phone]

## 2024-02-25 NOTE — ED PROVIDER NOTE - CLINICAL SUMMARY MEDICAL DECISION MAKING FREE TEXT BOX
Diffuse abdominal pain with more intense epigastric pain radiating to back, concerning for pancreatitis. Appendicitis/cholecystitis impossible given surgical history. Gastroenteritis, cholangitis, SBO, DKA, bowel ischemia, UTI, PID possible but less likely based on patient history and physical. Will order CT with IV and oral contrast to evaluate for serious intraabdominal pathology. Labs. GI cocktail, reassess.

## 2024-02-25 NOTE — ED PROVIDER NOTE - ATTENDING CONTRIBUTION TO CARE
Upon my evaluation, this patient had a high probability of imminent or life-threatening deterioration due to ABDOMINAL PAIN s/p GASTRTIC BYPASS LEADING TO INTERNAL HERNIA and COMPLETE OCCLUSION oF SMA which required my direct attention, intervention, and personal management.  The patient has a  medical condition that impairs one or more vital organ systems.  Frequent personal assessment and adjustment of medical interventions was performed.      I have personally provided 60 minutes of critical care time exclusive of time spent on separately billable procedures. Time includes review of laboratory data, radiology results, discussion with consultants, patient and family; monitoring for potential decompensation, as well as time spent retrieving data and reviewing the chart and documenting the visit. Interventions were performed as documented above.    HPI: 28-year-old female with past medical history significant for gastric bypass in 2021 appendectomy in 2023 August and cholecystectomy also in 2023 presents to the ED with complaints of severe epigastric pain.  Patient states that pain began 3 days ago and has steadily increased to the 10 out of 10 she is now experiencing.  Pain is sharp epigastric pain, radiating to BL back. Pain is associated with nausea, vomiting.  She is unable to get comfortable, but the most comfortable position is sitting upright and leaning forward, lying back is near impossible. Has only taken 2 tums for symptoms. Unable to tolerate PO since yesterday. Able to pass gas. Last BM this morning. No diarrhea or constipation.  No chest pain or shortness of breath. No dysuria. No black or bloody stool.    EXAM: Uncomfortable appearing retching.  Abdomen is diffusely tender.  Rebound plus guarding.  No signs of trauma.    MDM: 28-year-old female with past medical history significant for gastric bypass in 2021 status post appendectomy in 2023 and cholecystectomy in 2023 that presents with severe epigastric pain now radiating diffusely throughout her abdomen.  Abdomen is diffusely tender with rebound and guarding.  Concern for peritoneal issues possible SMA occlusion.  At this time will obtain labs and CT imaging and reassess but in the meantime we will control pain and make n.p.o. and provide IV fluids.

## 2024-02-25 NOTE — H&P ADULT - NSHPPHYSICALEXAM_GEN_ALL_CORE
Gen: uncomfortable appearing  HEENT: EOMI, anicteric  CV: well perfused, rrr radial  Resp: non labored, room air   Abd: soft, nondistended, minimal tender epigastrium, but recent pain meds, multiple old port sites with keloids  extremities: HERNANDES x4, no limitations

## 2024-02-25 NOTE — H&P ADULT - NSHPLABSRESULTS_GEN_ALL_CORE
10.9   6.31  )-----------( 396      ( 25 Feb 2024 17:39 )             34.3   < from: CT Abdomen and Pelvis w/ Oral Cont and w/ IV Cont (02.25.24 @ 21:38) >    INTERPRETATION:  CLINICAL INFORMATION: Severe abdominal pain. History of   multiple abdominal surgeries.    COMPARISON: CT abdomen pelvis 11/25/2023.    CONTRAST/COMPLICATIONS:  IV Contrast: Omnipaque 350  90 cc administered   10 cc discarded  Oral Contrast: Omnipaque 300   Fruit 2o  Complications: None reported at time of study completion    PROCEDURE:  CT of the Abdomen and Pelvis was performed.  Sagittal and coronal reformats were performed.    FINDINGS:  LOWER CHEST: Within normal limits.    LIVER: Small right lobe hepatic cysts unchanged.  BILE DUCTS: Normal caliber.  GALLBLADDER: Within normal limits.  SPLEEN: Withinnormal limits.  PANCREAS: Within normal limits.  ADRENALS: Within normal limits.  KIDNEYS/URETERS: Within normal limits.    BLADDER: Within normal limits.  REPRODUCTIVE ORGANS: Uterus and adnexa within normal limits.    BOWEL: Appendectomy. Status post gastric bypass. There is swirling of the   midline mesentery with associated twisting of the adjacent vessels. Of   note the superior mesenteric vein is twisted and completely occluded   which is new from the previous exam (301:42) (602:47). There is   engorgement of the associated mesenteric vessels and mild mesenteric   edema. Oral contrast passes from the gastric pouch and C2 lamina and into   the distal small bowel without obstruction.  PERITONEUM: Trace pelvic ascites likely physiologic.  VESSELS: Within normal limits.  RETROPERITONEUM/LYMPH NODES: No lymphadenopathy.  ABDOMINAL WALL: Within normal limits.  BONES: Within normal limits.    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 This is new since the previous exam. There is no bowel   obstruction at this time.  Immediate surgical consultation is recommended.    < end of copied text >

## 2024-02-25 NOTE — ED PROVIDER NOTE - PHYSICAL EXAMINATION
Gen: AAOx3, non-toxic appearing but in distress, crying, holding her abdomen  HEENT: NCAT, normal conjunctiva, oral mucosa dry  Lung: speaking in full sentences, good aeration bilaterally, lungs CTA b/l  CV: regular rate and rhythm. cap refill <2x. peripheral pulses 2+bilaterally   Abd: soft, ND, diffusely tender, but more tpp in epigastric area  MSK: no visible deformities  Neuro: No focal deficits  Skin: Intact  Psych: distressed affect, appropriate for situation

## 2024-02-25 NOTE — ED PROVIDER NOTE - PROGRESS NOTE DETAILS
DINO: Patient CT was revealing of an internal hernia per radiology read.  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  Patient was made aware.  She is now made n.p.o. preop labs ordered pain medication ordered and surgery is going to be consulted urgently. Lillian Rivera (PGY1): surg consulted, coming to see patient DINO: Patient seen and evaluated by surgery therapy to take patient to the operating room.  Will admit to their service at this time.

## 2024-02-25 NOTE — H&P ADULT - ASSESSMENT
28F with prior lap rnygb for obesity in 2022, down 138lbs, with lap appy 8/23, lap zayra 11/23 presents with 12 hours of band like epigastric pain and lactate 2.2, associated nausea and vomiting, no bowel obstruction, but CT concerning for internal hernia with SMA occlusion within hernia.      Recs:  -npo  -or for dx lap  -iv fluid  -d/w Dr. Jm Shah MD, PGY3  B Team Surgery

## 2024-02-25 NOTE — ED ADULT TRIAGE NOTE - CHIEF COMPLAINT QUOTE
increased abd pains today. pain radiates to back ,chest. nausea, dry heaves. gall bladder surgery 2023, gastric bypass,2021,ap,8/23. denies problems urinating

## 2024-02-26 ENCOUNTER — TRANSCRIPTION ENCOUNTER (OUTPATIENT)
Age: 29
End: 2024-02-26

## 2024-02-26 VITALS
HEART RATE: 76 BPM | SYSTOLIC BLOOD PRESSURE: 121 MMHG | TEMPERATURE: 98 F | RESPIRATION RATE: 17 BRPM | DIASTOLIC BLOOD PRESSURE: 67 MMHG | OXYGEN SATURATION: 100 %

## 2024-02-26 LAB
ANION GAP SERPL CALC-SCNC: 10 MMOL/L — SIGNIFICANT CHANGE UP (ref 7–14)
BLD GP AB SCN SERPL QL: NEGATIVE — SIGNIFICANT CHANGE UP
BUN SERPL-MCNC: 6 MG/DL — LOW (ref 7–23)
CALCIUM SERPL-MCNC: 8.9 MG/DL — SIGNIFICANT CHANGE UP (ref 8.4–10.5)
CHLORIDE SERPL-SCNC: 102 MMOL/L — SIGNIFICANT CHANGE UP (ref 98–107)
CO2 SERPL-SCNC: 23 MMOL/L — SIGNIFICANT CHANGE UP (ref 22–31)
CREAT SERPL-MCNC: 0.74 MG/DL — SIGNIFICANT CHANGE UP (ref 0.5–1.3)
CULTURE RESULTS: SIGNIFICANT CHANGE UP
EGFR: 114 ML/MIN/1.73M2 — SIGNIFICANT CHANGE UP
GLUCOSE SERPL-MCNC: 110 MG/DL — HIGH (ref 70–99)
HCT VFR BLD CALC: 29.6 % — LOW (ref 34.5–45)
HGB BLD-MCNC: 9.7 G/DL — LOW (ref 11.5–15.5)
MAGNESIUM SERPL-MCNC: 2 MG/DL — SIGNIFICANT CHANGE UP (ref 1.6–2.6)
MCHC RBC-ENTMCNC: 29.1 PG — SIGNIFICANT CHANGE UP (ref 27–34)
MCHC RBC-ENTMCNC: 32.8 GM/DL — SIGNIFICANT CHANGE UP (ref 32–36)
MCV RBC AUTO: 88.9 FL — SIGNIFICANT CHANGE UP (ref 80–100)
NRBC # BLD: 0 /100 WBCS — SIGNIFICANT CHANGE UP (ref 0–0)
NRBC # FLD: 0 K/UL — SIGNIFICANT CHANGE UP (ref 0–0)
PHOSPHATE SERPL-MCNC: 4.3 MG/DL — SIGNIFICANT CHANGE UP (ref 2.5–4.5)
PLATELET # BLD AUTO: 343 K/UL — SIGNIFICANT CHANGE UP (ref 150–400)
POTASSIUM SERPL-MCNC: 4.2 MMOL/L — SIGNIFICANT CHANGE UP (ref 3.5–5.3)
POTASSIUM SERPL-SCNC: 4.2 MMOL/L — SIGNIFICANT CHANGE UP (ref 3.5–5.3)
RBC # BLD: 3.33 M/UL — LOW (ref 3.8–5.2)
RBC # FLD: 13.1 % — SIGNIFICANT CHANGE UP (ref 10.3–14.5)
RH IG SCN BLD-IMP: POSITIVE — SIGNIFICANT CHANGE UP
SODIUM SERPL-SCNC: 135 MMOL/L — SIGNIFICANT CHANGE UP (ref 135–145)
SPECIMEN SOURCE: SIGNIFICANT CHANGE UP
WBC # BLD: 6.87 K/UL — SIGNIFICANT CHANGE UP (ref 3.8–10.5)
WBC # FLD AUTO: 6.87 K/UL — SIGNIFICANT CHANGE UP (ref 3.8–10.5)

## 2024-02-26 RX ORDER — OXYCODONE HYDROCHLORIDE 5 MG/1
2.5 TABLET ORAL EVERY 4 HOURS
Refills: 0 | Status: DISCONTINUED | OUTPATIENT
Start: 2024-02-26 | End: 2024-02-26

## 2024-02-26 RX ORDER — ONDANSETRON 8 MG/1
4 TABLET, FILM COATED ORAL ONCE
Refills: 0 | Status: DISCONTINUED | OUTPATIENT
Start: 2024-02-26 | End: 2024-02-26

## 2024-02-26 RX ORDER — INFLUENZA VIRUS VACCINE 15; 15; 15; 15 UG/.5ML; UG/.5ML; UG/.5ML; UG/.5ML
0.5 SUSPENSION INTRAMUSCULAR ONCE
Refills: 0 | Status: DISCONTINUED | OUTPATIENT
Start: 2024-02-26 | End: 2024-02-26

## 2024-02-26 RX ORDER — IBUPROFEN 200 MG
200 TABLET ORAL EVERY 6 HOURS
Refills: 0 | Status: DISCONTINUED | OUTPATIENT
Start: 2024-02-26 | End: 2024-02-26

## 2024-02-26 RX ORDER — HYDROMORPHONE HYDROCHLORIDE 2 MG/ML
0.5 INJECTION INTRAMUSCULAR; INTRAVENOUS; SUBCUTANEOUS
Refills: 0 | Status: DISCONTINUED | OUTPATIENT
Start: 2024-02-26 | End: 2024-02-26

## 2024-02-26 RX ORDER — ACETAMINOPHEN 500 MG
975 TABLET ORAL EVERY 6 HOURS
Refills: 0 | Status: DISCONTINUED | OUTPATIENT
Start: 2024-02-26 | End: 2024-02-26

## 2024-02-26 RX ORDER — OXYCODONE HYDROCHLORIDE 5 MG/1
1 TABLET ORAL
Qty: 12 | Refills: 0
Start: 2024-02-26 | End: 2024-02-28

## 2024-02-26 RX ORDER — ACETAMINOPHEN 500 MG
1000 TABLET ORAL EVERY 6 HOURS
Refills: 0 | Status: DISCONTINUED | OUTPATIENT
Start: 2024-02-26 | End: 2024-02-26

## 2024-02-26 RX ORDER — FENTANYL CITRATE 50 UG/ML
25 INJECTION INTRAVENOUS
Refills: 0 | Status: DISCONTINUED | OUTPATIENT
Start: 2024-02-26 | End: 2024-02-26

## 2024-02-26 RX ADMIN — Medication 1000 MILLIGRAM(S): at 08:15

## 2024-02-26 RX ADMIN — Medication 975 MILLIGRAM(S): at 13:18

## 2024-02-26 RX ADMIN — Medication 400 MILLIGRAM(S): at 07:15

## 2024-02-26 RX ADMIN — Medication 975 MILLIGRAM(S): at 12:47

## 2024-02-26 RX ADMIN — ENOXAPARIN SODIUM 40 MILLIGRAM(S): 100 INJECTION SUBCUTANEOUS at 07:15

## 2024-02-26 RX ADMIN — Medication 975 MILLIGRAM(S): at 18:11

## 2024-02-26 NOTE — BRIEF OPERATIVE NOTE - OPERATION/FINDINGS
Prior RNYGB with significant weight loss, diagnostic laparoscopy via veress needle and optivue, 4 additional working 5mm trocars placed under vision.  Bowel ran from decompressed TI to LoT, reduction of internal hernia. Both alimentary and biliopancreatic limbs ran x2.  Identification and closure of Petersens defect using silk suture x2. Ports removed without incident and skin closed.

## 2024-02-26 NOTE — PROGRESS NOTE ADULT - SUBJECTIVE AND OBJECTIVE BOX
B TEAM SURGERY DAILY PROGRESS NOTE    SUBJECTIVE:   Patient seen and evaluated on AM rounds. Tolerating Norman III diet. Endorses (+/+) for flatus and BM.  Patient otherwise denies nausea, vomiting, chest pain, shortness of breath     OBJECTIVE:  Vital Signs Last 24 Hrs  T(C): 36.3 (2024 07:14), Max: 37 (2024 04:30)  T(F): 97.4 (2024 07:14), Max: 98.6 (2024 04:30)  HR: 61 (2024 07:14) (58 - 92)  BP: 114/79 (2024 07:14) (114/79 - 142/90)  BP(mean): 92 (2024 04:30) (92 - 121)  RR: 18 (2024 07:14) (10 - 20)  SpO2: 100% (2024 07:14) (95% - 100%)    Parameters below as of 2024 07:14  Patient On (Oxygen Delivery Method): room air      Daily Height in cm: 172.72 (2024 05:25)    Daily Weight in k.6 (2024 05:25)  MILKA:      Chest Tube:      NG Tube:     STANDING  acetaminophen   IVPB .. 1000 milliGRAM(s) IV Intermittent every 6 hours  enoxaparin Injectable 40 milliGRAM(s) SubCutaneous every 24 hours  influenza   Vaccine 0.5 milliLiter(s) IntraMuscular once    PRN  aluminum hydroxide/magnesium hydroxide/simethicone Suspension 30 milliLiter(s) Oral every 4 hours PRN Dyspepsia  oxyCODONE    IR 2.5 milliGRAM(s) Oral every 4 hours PRN Moderate Pain (4 - 6)      Labs:  139  |  103  |  10  ----------------------------<  102<H>    (-)  3.9   |  23  |  0.86          Ca    9.5      02-25  Mg    x  Phos  x          Urinalysis Basic - ( 2024 18:03 )    Color: Yellow / Appearance: Clear / S.025 / pH: x  Gluc: x / Ketone: 15 mg/dL  / Bili: Negative / Urobili: 1.0 mg/dL   Blood: x / Protein: Negative mg/dL / Nitrite: Negative   Leuk Esterase: Negative / RBC: 1 /HPF / WBC 0 /HPF   Sq Epi: x / Non Sq Epi: 1 /HPF / Bacteria: Negative /HPF    Urinalysis Basic - ( 2024 18:03 )    Color: Yellow / Appearance: Clear / S.025 / pH: x  Gluc: x / Ketone: 15 mg/dL  / Bili: Negative / Urobili: 1.0 mg/dL   Blood: x / Protein: Negative mg/dL / Nitrite: Negative   Leuk Esterase: Negative / RBC: 1 /HPF / WBC 0 /HPF   Sq Epi: x / Non Sq Epi: 1 /HPF / Bacteria: Negative /HPF    Physical Exam:  General: well developed, well nourished, NAD  Cardiovascular: appears well perfused   Respiratory: respirations non labored  Gastrointestinal: soft, appropriate incisional tenderness, nondistended. Incisional sites c/d/i.  Extremities: FROM, warm  Neurological: A+Ox3

## 2024-02-26 NOTE — PROGRESS NOTE ADULT - ASSESSMENT
28F with prior lap RNYGB for obesity in 2022, down 138lbs, with lap appy 8/23, lap zayra 11/23 presented with 12 hours of band like epigastric pain and lactate 2.2, associated nausea and vomiting, no bowel obstruction. CT obtained concerning for internal hernia with SMA occlusion within hernia. Patient is now s/p dx lap, repair of internal hernia (Villa's defect), 2/26.     Plan:  - Bariatric 3 diet  - Pain control PRN  - SQH: LOV  - dc' home today vs. tmrw  - f/u am labs    B team surgery  [id number]

## 2024-02-26 NOTE — DISCHARGE NOTE PROVIDER - HOSPITAL COURSE
28F prior lap rnygb in 2022, lap appy 8/23, lap zayra 11/23 presents with 12 hours of acute onset epigastric pain in a belt like distribution.  Associated nausea, and vomiting, no bloody bm, denies obstructive symptoms. No fevers or chills.  CT scan c/f internal hernia with sma occlusion.   Patient admitted to acute care surgery service and brought emergently to the OR where she underwent diagnostic laparoscopy closure of petersens defect. Patient tolerated procedure well without complication.  Patient transferred to surgical floor. Diet advanced to regular as tolerated. IV medications transitioned to oral.  Patient currently tolerating diet, ambulating, voiding, having GI function and pain is well controlled.   Per team and attending patient hemodynamically stable for discharge home and follow up with Dr. Oneal.

## 2024-02-26 NOTE — DISCHARGE NOTE PROVIDER - CARE PROVIDER_API CALL
Hazel Oneal [facility]  Surgery  [address] [place] [address]  Phone: [phone]  Fax: [phone]  Follow Up Time: 2 weeks

## 2024-02-26 NOTE — PROGRESS NOTE ADULT - ATTENDING COMMENTS
The patient was seen and examined, chart and notes reviewed.  The current diagnosis, plan of care and alternatives have been discussed with the patient.  All questions have been answered and updates have been discussed.  The case was discussed with B team residents/PA's and medical students at morning B surgical rounds and throughout the course of the day.    Agree with plan

## 2024-02-26 NOTE — DISCHARGE NOTE PROVIDER - NSDCCPCAREPLAN_GEN_ALL_CORE_FT
PRINCIPAL DISCHARGE DIAGNOSIS  Diagnosis: Obstruction concurrent with and due to internal hernia of abdomen  Assessment and Plan of Treatment: WOUND CARE:  Port sites clean dry intact. Do not rub or scrub wound. You may shower. Pat Dry abdomen. Leave the white steri strips in place, they will fall off on their own in approximately 5-7 days.   BATHING: Please do not submerge wound underwater. You may shower and/or sponge bathe.  ACTIVITY: No heavy lifting or straining. Otherwise, you may return to your usual level of physical activity. If you are taking narcotic pain medication (such as Percocet) DO NOT drive a car, operate machinery or make important decisions.  DIET: Return to your usual diet.  NOTIFY YOUR SURGEON IF: You have any bleeding that does not stop, any pus draining from your wound(s), any fever (over 100.4 F) or chills, persistent nausea/vomiting, persistent diarrhea, or if your pain is not controlled on your discharge pain medications.  FOLLOW-UP: Please follow up with your primary care physician in one week regarding your hospitalization

## 2024-02-26 NOTE — DISCHARGE NOTE PROVIDER - NSDCMRMEDTOKEN_GEN_ALL_CORE_FT
acetaminophen 650 mg oral tablet, extended release: 1 tab(s) orally every 6 hours  ibuprofen 400 mg oral tablet: 1 tab(s) orally every 8 hours as needed for  moderate pain  oxyCODONE 5 mg oral tablet: 1 tab(s) orally every 6 hours as needed for  severe pain MDD: 4   acetaminophen 650 mg oral tablet, extended release: 1 tab(s) orally every 6 hours  ibuprofen 400 mg oral tablet: 1 tab(s) orally every 8 hours as needed for  moderate pain  oxyCODONE 5 mg oral tablet: 1 tab(s) orally every 6 hours MDD: 4 tabs

## 2024-02-26 NOTE — PATIENT PROFILE ADULT - FALL HARM RISK - HARM RISK INTERVENTIONS
Communicate Risk of Fall with Harm to all staff/Monitor for mental status changes/Monitor gait and stability/Reinforce activity limits and safety measures with patient and family/Review medications for side effects contributing to fall risk/Sit up slowly, dangle for a short time, stand at bedside before walking/Tailored Fall Risk Interventions/Visual Cue: Yellow wristband and red socks/Bed in lowest position, wheels locked, appropriate side rails in place/Call bell, personal items and telephone in reach/Instruct patient to call for assistance before getting out of bed or chair/Non-slip footwear when patient is out of bed/[place] to call system/Physically safe environment - no spills, clutter or unnecessary equipment/Purposeful Proactive Rounding/Room/bathroom lighting operational, light cord in reach

## 2024-02-26 NOTE — DISCHARGE NOTE NURSING/CASE MANAGEMENT/SOCIAL WORK - PATIENT PORTAL LINK FT
The patient was seen and examined with the cardiology consultation team.     He is a 22-year-old man with chronic kidney disease and hypertension who presented with altered mental status, agitation and behavior changes over the last several days. The patient has hypertension for which he is treated with a multi-drug regimen as an outpatient, and reportedly at baseline it is well-controlled. We are consulted for the management of hypertension.    The patient has no complaints. On our evaluation, he is calm and answers questions appropriately. There is no evidence of decompensated heart failure on examination. Since receiving medications, the patient's blood pressure has been much better controlled.     Re-starting the patient's outpatient antihypertensive regimen seems like a reasonable approach to this patient. His severe hypertension is likely explained by his acute agitation, medication non-adherence, as well as rebound hypertension related to abrupt cessation of clonidine. Now that these issues have been addressed, I suspect his outpatient regimen will be sufficient.     On review of his current medications, it is noted that the patient's labetalol was significantly increased. While this may have been appropriate in the acute hypertensive setting, it is unclear whether or not it will be necessary long-term. (There is no significant decline in his renal function, for example, that might physiologically make his blood pressure more difficult to manage.) It is possible that in the setting of having the acute inciting issues addressed, the increased doses of labetalol could precipitate hypotension. If this occurs, I would suggest putting the patient back on his home dose.     Please reconsult us with additional questions.    Matthias Rose MD  Cardiology  [phone]
Uncontrolled HTN  SERA on CKD    Will slowly try to control BP, consider increasing ACE-I
You can access the FollowMyHealth Patient Portal offered by [facility] by registering at the following website: [email or web address]. By joining [employer]’s FollowMyHealth portal, you will also be able to view your health information using other applications (apps) compatible with our system.

## 2024-03-14 ENCOUNTER — OUTPATIENT (OUTPATIENT)
Dept: OUTPATIENT SERVICES | Facility: HOSPITAL | Age: 29
LOS: 1 days | End: 2024-03-14

## 2024-03-14 ENCOUNTER — APPOINTMENT (OUTPATIENT)
Dept: TRAUMA SURGERY | Facility: CLINIC | Age: 29
End: 2024-03-14
Payer: MEDICAID

## 2024-03-14 VITALS
WEIGHT: 176 LBS | HEIGHT: 68 IN | DIASTOLIC BLOOD PRESSURE: 76 MMHG | SYSTOLIC BLOOD PRESSURE: 120 MMHG | HEART RATE: 72 BPM | OXYGEN SATURATION: 99 % | BODY MASS INDEX: 26.67 KG/M2

## 2024-03-14 DIAGNOSIS — Z90.49 ACQUIRED ABSENCE OF OTHER SPECIFIED PARTS OF DIGESTIVE TRACT: Chronic | ICD-10-CM

## 2024-03-14 DIAGNOSIS — Z98.84 BARIATRIC SURGERY STATUS: Chronic | ICD-10-CM

## 2024-03-14 PROCEDURE — 99024 POSTOP FOLLOW-UP VISIT: CPT

## 2024-03-14 NOTE — PHYSICAL EXAM
[Normal Breath Sounds] : Normal breath sounds [Normal Heart Sounds] : normal heart sounds [de-identified] : NAD [Calm] : calm [de-identified] : Soft, nondistended, nontender, incisions c/d/i, no palpable hernias. most incisions keloided.

## 2024-03-14 NOTE — HISTORY OF PRESENT ILLNESS
[de-identified] : 29yo F with h/o gastric bypass s/p diagnostic lap, detorsion of internal hernia, closure of rivera's defect on 2/25. Pt reports feeling well. She reports she is now able to eat without having any issues (no abdominal pain, bloating or diarrhea), which she has been struggling with for the past 2 years. She is tolerating a regular diet, having regular bowel movements and denies pain. Denies fevers/chills.

## 2024-03-14 NOTE — ASSESSMENT
[FreeTextEntry1] : 27yo F with h/o gastric bypass s/p diagnostic lap, detorsion of internal hernia, closure of rivera's defect on 2/25, recovering well.

## 2024-03-15 DIAGNOSIS — K45.8 OTHER SPECIFIED ABDOMINAL HERNIA WITHOUT OBSTRUCTION OR GANGRENE: ICD-10-CM

## 2024-12-20 ENCOUNTER — EMERGENCY (EMERGENCY)
Facility: HOSPITAL | Age: 29
LOS: 1 days | Discharge: HOME | End: 2024-12-20
Attending: STUDENT IN AN ORGANIZED HEALTH CARE EDUCATION/TRAINING PROGRAM | Admitting: EMERGENCY MEDICINE
Payer: MEDICAID

## 2024-12-20 VITALS
HEART RATE: 68 BPM | WEIGHT: 177.91 LBS | TEMPERATURE: 98 F | SYSTOLIC BLOOD PRESSURE: 135 MMHG | RESPIRATION RATE: 17 BRPM | HEIGHT: 69 IN | OXYGEN SATURATION: 98 % | DIASTOLIC BLOOD PRESSURE: 80 MMHG

## 2024-12-20 DIAGNOSIS — Z98.84 BARIATRIC SURGERY STATUS: Chronic | ICD-10-CM

## 2024-12-20 DIAGNOSIS — Z90.49 ACQUIRED ABSENCE OF OTHER SPECIFIED PARTS OF DIGESTIVE TRACT: Chronic | ICD-10-CM

## 2024-12-20 PROCEDURE — 99284 EMERGENCY DEPT VISIT MOD MDM: CPT
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
## 2024-12-20 NOTE — ED PROVIDER NOTE - PATIENT PORTAL LINK FT
Negative You can access the FollowMyHealth Patient Portal offered by [facility] by registering at the following website: [email or web address]. By joining [employer]’s FollowMyHealth portal, you will also be able to view your health information using other applications (apps) compatible with our system.
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
## 2024-12-20 NOTE — ED ADULT NURSE NOTE - NSICDXPASTSURGICALHX_GEN_ALL_CORE_FT
No
PAST SURGICAL HISTORY:  H/O gastric bypass     History of appendectomy     S/P cholecystectomy
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
## 2024-12-21 VITALS
OXYGEN SATURATION: 99 % | DIASTOLIC BLOOD PRESSURE: 82 MMHG | RESPIRATION RATE: 16 BRPM | SYSTOLIC BLOOD PRESSURE: 119 MMHG | TEMPERATURE: 98 F | HEART RATE: 70 BPM

## 2024-12-21 PROCEDURE — 70450 CT HEAD/BRAIN W/O DYE: CPT | Mod: 26,MC

## 2025-02-25 NOTE — H&P ADULT - NSICDXFAMILYHX_GEN_ALL_CORE_FT
Subjective   Cathryn is a 71 year old female who presents for Medicare Wellness Visit and Medicare Wellness Visit (Subsequent)    HPI  The patient is a 71-year-old female who presents for a wellness visit.    She has been on a daily regimen of pantoprazole, which has effectively managed her gastric acid symptoms. She reports no instances of heartburn, reflux, or chronic cough. She has not undergone an endoscopy. She has not missed any doses of her medication.    Her mood has shown significant improvement with the current dosage of citalopram 30 mg. She infrequently uses alprazolam for panic attacks. She expresses a desire to maintain her current medication regimen.    She has not had a Pap smear recently but reports no history of abnormal results or postmenopausal bleeding. She also reports no bladder issues or leg swelling. She has declined the influenza and pneumonia vaccines this year.    Supplemental Information  She had an infection about a month ago, which has resolved.    MEDICATIONS  pantoprazole, citalopram, alprazolam    IMMUNIZATIONS  She declined influenza and pneumonia vaccines.    Review of Systems  As documented above.    Objective   Vitals:    02/24/25 1050   BP: 122/80   Pulse: 76   SpO2: 99%   Weight: 68 kg (150 lb)   Height: 5' 2\" (1.575 m)   BMI (Calculated): 27.44     Physical Exam  General: Alert.   Head: Normocephalic.  Eyes: Pupils equal, round and reactive to light. Conjunctivae clear.  Ear: Tympanic membranes and external ear canals normal.  Nose: Nares normal. No sinus tenderness.  Throat: Lips, mucosa, and tongue normal. Teeth and gums normal. Pharyngeal mucosa non-inflamed.  Neck: Supple. Thyroid is normal. Lymphadenopathy is not present.  Respiratory: Normal respiratory effort.   Cardiovascular: Normal rate and regular rhythm. Normal S1, S2. Murmurs are not present.  Lungs: Clear to auscultation. Wheezing, rales or rhonchi not present.  Musculoskeletal: Gait: normal.  Psychiatric: Mood  is normal and affect is normal.  ABDOMEN: Nontender, nondistended, no hepatomegaly, no splenomegaly, no masses, bowel sounds normal.   Nose and mouth were examined.  Lungs were auscultated.        Assessment & Plan   Medicare wellness exam  See orders  1. Gastroesophageal Reflux Disease (GERD).  She is currently taking pantoprazole (Protonix) daily for stomach acid management. She reports no symptoms of heartburn, reflux, or chronic cough. She is advised to reduce the frequency of pantoprazole intake to 5 days per week to assess if there is any noticeable difference in symptoms. If symptoms worsen, she should revert to daily intake and inform the clinic. A potential reduction to a daily dose of 20 mg may be considered after a month or two if symptoms remain controlled.    2. Mood Disorder.  Her mood has significantly improved with the current regimen of citalopram 30 mg. She uses alprazolam (Xanax) very seldom for panic attacks. She will continue with the current dosage of citalopram and alprazolam as needed.    3. Health Maintenance.  Her wellness mammogram is scheduled. She is up-to-date with her colonoscopy and bone density tests. She has not had a recent Pap smear but has no history of abnormal results or postmenopausal bleeding. She is not interested in receiving the influenza or pneumonia vaccines this year. A comprehensive set of laboratory tests, including CBC, CMP, cholesterol panel, TSH, and urinalysis, will be conducted. She is advised to return fasting for these tests, ensuring a 12-hour fast from food and a 24-hour abstinence from alcohol. Adequate hydration is recommended, particularly for kidney function assessment.    Follow-up  The patient will follow up in 6 months.    1. Medicare annual wellness visit, subsequent  -     CBC No Differential; Future  -     Comprehensive Metabolic Panel; Future  -     Lipid Panel With Reflex; Future  -     Thyroid Stimulating Hormone Reflex; Future  -     Urinalysis  & Reflex Microscopy; Future  -      - Subsequent Medicare Wellness Visit - Select if billing add'l E/M  2. Anxiety  -     OFFICE OR OTHER OUTPT VISIT EST PT 30 MINS OR MORE MOD MDM LVL 4  3. Stage 3a chronic kidney disease (CKD)  (CMD)  -     OFFICE OR OTHER OUTPT VISIT EST PT 30 MINS OR MORE MOD MDM LVL 4  4. Mild major depression (CMD)  -     OFFICE OR OTHER OUTPT VISIT EST PT 30 MINS OR MORE MOD MDM LVL 4  5. Sicca complex  (CMD)  -     OFFICE OR OTHER OUTPT VISIT EST PT 30 MINS OR MORE MOD MDM LVL 4     FAMILY HISTORY:  No pertinent family history in first degree relatives

## 2025-09-06 ENCOUNTER — EMERGENCY (EMERGENCY)
Facility: HOSPITAL | Age: 30
LOS: 0 days | Discharge: HOME | End: 2025-09-06
Attending: STUDENT IN AN ORGANIZED HEALTH CARE EDUCATION/TRAINING PROGRAM
Payer: COMMERCIAL

## 2025-09-06 VITALS
DIASTOLIC BLOOD PRESSURE: 76 MMHG | SYSTOLIC BLOOD PRESSURE: 107 MMHG | HEART RATE: 73 BPM | HEIGHT: 68 IN | TEMPERATURE: 98 F | WEIGHT: 173.06 LBS | RESPIRATION RATE: 19 BRPM | OXYGEN SATURATION: 98 %

## 2025-09-06 VITALS
HEART RATE: 63 BPM | SYSTOLIC BLOOD PRESSURE: 108 MMHG | DIASTOLIC BLOOD PRESSURE: 70 MMHG | OXYGEN SATURATION: 100 % | TEMPERATURE: 98 F | RESPIRATION RATE: 19 BRPM

## 2025-09-06 DIAGNOSIS — Z90.49 ACQUIRED ABSENCE OF OTHER SPECIFIED PARTS OF DIGESTIVE TRACT: Chronic | ICD-10-CM
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
## 2025-09-06 DIAGNOSIS — Z98.84 BARIATRIC SURGERY STATUS: Chronic | ICD-10-CM

## 2025-09-06 DIAGNOSIS — M54.2 CERVICALGIA: ICD-10-CM

## 2025-09-06 DIAGNOSIS — Z04.1 ENCOUNTER FOR EXAMINATION AND OBSERVATION FOLLOWING TRANSPORT ACCIDENT: ICD-10-CM
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
## 2025-09-06 PROCEDURE — 99283 EMERGENCY DEPT VISIT LOW MDM: CPT

## 2025-09-06 RX ORDER — ACETAMINOPHEN 500 MG/5ML
650 LIQUID (ML) ORAL ONCE
Refills: 0 | Status: COMPLETED | OUTPATIENT
Start: 2025-09-06 | End: 2025-09-06

## 2025-09-06 RX ORDER — LIDOCAINE HYDROCHLORIDE 20 MG/ML
1 JELLY TOPICAL ONCE
Refills: 0 | Status: COMPLETED | OUTPATIENT
Start: 2025-09-06 | End: 2025-09-06

## 2025-09-06 RX ADMIN — LIDOCAINE HYDROCHLORIDE 1 PATCH: 20 JELLY TOPICAL at 16:02

## 2025-09-06 RX ADMIN — Medication 650 MILLIGRAM(S): at 14:59

## 2025-09-12 ENCOUNTER — EMERGENCY (EMERGENCY)
Facility: HOSPITAL | Age: 30
LOS: 0 days | Discharge: HOME | End: 2025-09-12
Attending: STUDENT IN AN ORGANIZED HEALTH CARE EDUCATION/TRAINING PROGRAM
Payer: COMMERCIAL

## 2025-09-12 VITALS
HEART RATE: 78 BPM | OXYGEN SATURATION: 100 % | WEIGHT: 173.06 LBS | TEMPERATURE: 98 F | HEIGHT: 68 IN | SYSTOLIC BLOOD PRESSURE: 123 MMHG | RESPIRATION RATE: 18 BRPM | DIASTOLIC BLOOD PRESSURE: 88 MMHG

## 2025-09-12 VITALS
HEART RATE: 72 BPM | TEMPERATURE: 98 F | SYSTOLIC BLOOD PRESSURE: 109 MMHG | RESPIRATION RATE: 16 BRPM | DIASTOLIC BLOOD PRESSURE: 73 MMHG | OXYGEN SATURATION: 99 %

## 2025-09-12 DIAGNOSIS — R11.0 NAUSEA: ICD-10-CM

## 2025-09-12 DIAGNOSIS — Z90.49 ACQUIRED ABSENCE OF OTHER SPECIFIED PARTS OF DIGESTIVE TRACT: Chronic | ICD-10-CM

## 2025-09-12 DIAGNOSIS — R06.00 DYSPNEA, UNSPECIFIED: ICD-10-CM

## 2025-09-12 DIAGNOSIS — Z98.84 BARIATRIC SURGERY STATUS: Chronic | ICD-10-CM

## 2025-09-12 DIAGNOSIS — R07.89 OTHER CHEST PAIN: ICD-10-CM

## 2025-09-12 DIAGNOSIS — R07.9 CHEST PAIN, UNSPECIFIED: ICD-10-CM

## 2025-09-12 LAB
ALBUMIN SERPL ELPH-MCNC: 3.5 G/DL — SIGNIFICANT CHANGE UP (ref 3.3–5)
ALP SERPL-CCNC: 126 U/L — HIGH (ref 40–120)
ALT FLD-CCNC: 30 U/L — SIGNIFICANT CHANGE UP (ref 12–78)
ANION GAP SERPL CALC-SCNC: 7 MMOL/L — SIGNIFICANT CHANGE UP (ref 5–17)
AST SERPL-CCNC: 20 U/L — SIGNIFICANT CHANGE UP (ref 15–37)
BASOPHILS # BLD AUTO: 0.03 K/UL — SIGNIFICANT CHANGE UP (ref 0–0.2)
BASOPHILS NFR BLD AUTO: 0.6 % — SIGNIFICANT CHANGE UP (ref 0–2)
BILIRUB SERPL-MCNC: 0.3 MG/DL — SIGNIFICANT CHANGE UP (ref 0.2–1.2)
BUN SERPL-MCNC: 15 MG/DL — SIGNIFICANT CHANGE UP (ref 7–23)
CALCIUM SERPL-MCNC: 8.8 MG/DL — SIGNIFICANT CHANGE UP (ref 8.5–10.1)
CHLORIDE SERPL-SCNC: 105 MMOL/L — SIGNIFICANT CHANGE UP (ref 96–108)
CO2 SERPL-SCNC: 25 MMOL/L — SIGNIFICANT CHANGE UP (ref 22–31)
CREAT SERPL-MCNC: 0.85 MG/DL — SIGNIFICANT CHANGE UP (ref 0.5–1.3)
D DIMER BLD IA.RAPID-MCNC: <150 NG/ML DDU — SIGNIFICANT CHANGE UP
EGFR: 96 ML/MIN/1.73M2 — SIGNIFICANT CHANGE UP
EGFR: 96 ML/MIN/1.73M2 — SIGNIFICANT CHANGE UP
EOSINOPHIL # BLD AUTO: 0.07 K/UL — SIGNIFICANT CHANGE UP (ref 0–0.5)
EOSINOPHIL NFR BLD AUTO: 1.3 % — SIGNIFICANT CHANGE UP (ref 0–6)
GLUCOSE SERPL-MCNC: 90 MG/DL — SIGNIFICANT CHANGE UP (ref 70–99)
HCG SERPL-ACNC: <1 MIU/ML — SIGNIFICANT CHANGE UP
HCT VFR BLD CALC: 34.8 % — SIGNIFICANT CHANGE UP (ref 34.5–45)
HGB BLD-MCNC: 11.3 G/DL — LOW (ref 11.5–15.5)
IMM GRANULOCYTES NFR BLD AUTO: 0 % — SIGNIFICANT CHANGE UP (ref 0–0.9)
LIDOCAIN IGE QN: 29 U/L — SIGNIFICANT CHANGE UP (ref 13–75)
LYMPHOCYTES # BLD AUTO: 2.98 K/UL — SIGNIFICANT CHANGE UP (ref 1–3.3)
LYMPHOCYTES # BLD AUTO: 56.3 % — HIGH (ref 13–44)
MCHC RBC-ENTMCNC: 29.7 PG — SIGNIFICANT CHANGE UP (ref 27–34)
MCHC RBC-ENTMCNC: 32.5 G/DL — SIGNIFICANT CHANGE UP (ref 32–36)
MCV RBC AUTO: 91.3 FL — SIGNIFICANT CHANGE UP (ref 80–100)
MONOCYTES # BLD AUTO: 0.38 K/UL — SIGNIFICANT CHANGE UP (ref 0–0.9)
MONOCYTES NFR BLD AUTO: 7.2 % — SIGNIFICANT CHANGE UP (ref 2–14)
NEUTROPHILS # BLD AUTO: 1.83 K/UL — SIGNIFICANT CHANGE UP (ref 1.8–7.4)
NEUTROPHILS NFR BLD AUTO: 34.6 % — LOW (ref 43–77)
NRBC BLD AUTO-RTO: 0 /100 WBCS — SIGNIFICANT CHANGE UP (ref 0–0)
PLATELET # BLD AUTO: 338 K/UL — SIGNIFICANT CHANGE UP (ref 150–400)
POTASSIUM SERPL-MCNC: 4.1 MMOL/L — SIGNIFICANT CHANGE UP (ref 3.5–5.3)
POTASSIUM SERPL-SCNC: 4.1 MMOL/L — SIGNIFICANT CHANGE UP (ref 3.5–5.3)
PROT SERPL-MCNC: 7.9 GM/DL — SIGNIFICANT CHANGE UP (ref 6–8.3)
RBC # BLD: 3.81 M/UL — SIGNIFICANT CHANGE UP (ref 3.8–5.2)
RBC # FLD: 13.5 % — SIGNIFICANT CHANGE UP (ref 10.3–14.5)
SODIUM SERPL-SCNC: 137 MMOL/L — SIGNIFICANT CHANGE UP (ref 135–145)
TROPONIN I, HIGH SENSITIVITY RESULT: <3 NG/L — SIGNIFICANT CHANGE UP
WBC # BLD: 5.29 K/UL — SIGNIFICANT CHANGE UP (ref 3.8–10.5)
WBC # FLD AUTO: 5.29 K/UL — SIGNIFICANT CHANGE UP (ref 3.8–10.5)

## 2025-09-12 PROCEDURE — 71046 X-RAY EXAM CHEST 2 VIEWS: CPT | Mod: 26

## 2025-09-12 PROCEDURE — 99285 EMERGENCY DEPT VISIT HI MDM: CPT

## 2025-09-12 PROCEDURE — 93010 ELECTROCARDIOGRAM REPORT: CPT

## (undated) DEVICE — WARMING BLANKET UPPER ADULT

## (undated) DEVICE — SUT SILK 3-0 18" SH (POP-OFF)

## (undated) DEVICE — PACK MAJOR ABDOMINAL WITH LAP

## (undated) DEVICE — SUT VICRYL 2-0 18" TIES UNDYED

## (undated) DEVICE — POSITIONER STRAP ARMBOARD VELCRO TS-30

## (undated) DEVICE — DRAPE 3/4 SHEET 52X76"

## (undated) DEVICE — Device

## (undated) DEVICE — ELCTR GROUNDING PAD ADULT COVIDIEN

## (undated) DEVICE — ELCTR BOVIE PENCIL SMOKE EVACUATION

## (undated) DEVICE — PACK GENERAL LAPAROSCOPY

## (undated) DEVICE — DISSECTOR ENDOSCOPIC KITTNER SINGLE TIP

## (undated) DEVICE — WARMING BLANKET FULL UNDERBODY

## (undated) DEVICE — PROTECTOR HEEL / ELBOW

## (undated) DEVICE — ELCTR BOVIE BLADE 3/4" EXTENDED LENGTH 6"

## (undated) DEVICE — LIGASURE IMPACT

## (undated) DEVICE — ELCTR REM POLYHESIVE PATIENT RETURN ELECTRODE ADULT

## (undated) DEVICE — SUT VICRYL 0 27" UR-6

## (undated) DEVICE — TUBING INSUFFLATION LAP FILTER 10FT

## (undated) DEVICE — POOLE SUCTION TIP

## (undated) DEVICE — FOLEY TRAY 16FR 5CC LF UMETER CLOSED

## (undated) DEVICE — TROCAR COVIDIEN VERSAPORT BLADELESS OPTICAL 5MM STANDARD

## (undated) DEVICE — SYR LUER LOK 20CC

## (undated) DEVICE — SUT VICRYL 3-0 27" SH UNDYED

## (undated) DEVICE — SOL IRR POUR NS 0.9% 1000ML

## (undated) DEVICE — LIGASURE MARYLAND 37CM

## (undated) DEVICE — SOL IRR POUR NS 0.9% 1500ML

## (undated) DEVICE — TUBING STRYKEFLOW II SUCTION / IRRIGATOR

## (undated) DEVICE — DRAPE TOWEL BLUE 17" X 24"

## (undated) DEVICE — ENDOCATCH 10MM SPECIMEN POUCH

## (undated) DEVICE — TROCAR COVIDIEN BLUNT TIP HASSAN 10MM STANDARD

## (undated) DEVICE — DRSG STERISTRIPS 0.5 X 4"

## (undated) DEVICE — VENODYNE/SCD SLEEVE CALF MEDIUM

## (undated) DEVICE — BASIN SET SINGLE

## (undated) DEVICE — ELCTR BOVIE TIP BLADE INSULATED 2.75" EDGE

## (undated) DEVICE — SUT VICRYL 2-0 27" SH UNDYED

## (undated) DEVICE — GLV 7.5 PROTEXIS (WHITE)

## (undated) DEVICE — D HELP - CLEARVIEW CLEARIFY SYSTEM

## (undated) DEVICE — TUBING OLYMPUS INSUFFLATION

## (undated) DEVICE — SUT MAXON 1 36" GS-24

## (undated) DEVICE — CANISTER DISPOSABLE THIN WALL 3000CC

## (undated) DEVICE — SOL IRR POUR H2O 500ML

## (undated) DEVICE — PROTECTOR HEEL / ELBOW FLUFFY

## (undated) DEVICE — BLADE SURGICAL #15 CARBON

## (undated) DEVICE — SUT VICRYL 0 18" TIES UNDYED

## (undated) DEVICE — SUT MONOCRYL 4-0 27" PS-2 UNDYED

## (undated) DEVICE — STAPLER SKIN VISI-STAT 35 WIDE

## (undated) DEVICE — TIP METZENBAUM SCISSOR MONOPOLAR ENDOCUT (ORANGE)

## (undated) DEVICE — SOL IRR POUR H2O 1500ML

## (undated) DEVICE — DRAPE FLUID WARMER 44 X 44"